# Patient Record
Sex: MALE | Race: WHITE | NOT HISPANIC OR LATINO | URBAN - METROPOLITAN AREA
[De-identification: names, ages, dates, MRNs, and addresses within clinical notes are randomized per-mention and may not be internally consistent; named-entity substitution may affect disease eponyms.]

---

## 2017-01-21 ENCOUNTER — AMBULATORY - HEALTHEAST (OUTPATIENT)
Dept: NEUROSURGERY | Facility: CLINIC | Age: 52
End: 2017-01-21

## 2017-01-25 ENCOUNTER — AMBULATORY - HEALTHEAST (OUTPATIENT)
Dept: NEUROSURGERY | Facility: CLINIC | Age: 52
End: 2017-01-25

## 2017-02-20 ENCOUNTER — COMMUNICATION - HEALTHEAST (OUTPATIENT)
Dept: NEUROSURGERY | Facility: CLINIC | Age: 52
End: 2017-02-20

## 2017-02-20 ENCOUNTER — AMBULATORY - HEALTHEAST (OUTPATIENT)
Dept: NEUROSURGERY | Facility: CLINIC | Age: 52
End: 2017-02-20

## 2017-02-20 DIAGNOSIS — M54.9 BACK PAIN: ICD-10-CM

## 2017-02-21 ENCOUNTER — AMBULATORY - HEALTHEAST (OUTPATIENT)
Dept: NEUROSURGERY | Facility: CLINIC | Age: 52
End: 2017-02-21

## 2017-02-23 ENCOUNTER — HOSPITAL ENCOUNTER (OUTPATIENT)
Dept: RADIOLOGY | Facility: CLINIC | Age: 52
Discharge: HOME | End: 2017-02-23
Attending: NEUROLOGICAL SURGERY

## 2017-02-23 ENCOUNTER — COMMUNICATION - HEALTHEAST (OUTPATIENT)
Dept: TELEHEALTH | Facility: CLINIC | Age: 52
End: 2017-02-23

## 2017-02-23 ENCOUNTER — COMMUNICATION - HEALTHEAST (OUTPATIENT)
Dept: NEUROSURGERY | Facility: CLINIC | Age: 52
End: 2017-02-23

## 2017-02-23 ENCOUNTER — OFFICE VISIT - HEALTHEAST (OUTPATIENT)
Dept: NEUROSURGERY | Facility: CLINIC | Age: 52
End: 2017-02-23

## 2017-02-23 DIAGNOSIS — M54.9 BACK PAIN: ICD-10-CM

## 2017-02-24 ENCOUNTER — AMBULATORY - HEALTHEAST (OUTPATIENT)
Dept: NEUROSURGERY | Facility: CLINIC | Age: 52
End: 2017-02-24

## 2017-03-03 ENCOUNTER — OFFICE VISIT - HEALTHEAST (OUTPATIENT)
Dept: PHYSICAL THERAPY | Facility: REHABILITATION | Age: 52
End: 2017-03-03

## 2017-03-03 ENCOUNTER — RECORDS - HEALTHEAST (OUTPATIENT)
Dept: BONE DENSITY | Facility: CLINIC | Age: 52
End: 2017-03-03

## 2017-03-03 ENCOUNTER — RECORDS - HEALTHEAST (OUTPATIENT)
Dept: GENERAL RADIOLOGY | Facility: CLINIC | Age: 52
End: 2017-03-03

## 2017-03-03 DIAGNOSIS — M54.50 CHRONIC MIDLINE LOW BACK PAIN WITHOUT SCIATICA: ICD-10-CM

## 2017-03-03 DIAGNOSIS — M54.9 DORSALGIA, UNSPECIFIED: ICD-10-CM

## 2017-03-03 DIAGNOSIS — M62.81 MUSCLE WEAKNESS (GENERALIZED): ICD-10-CM

## 2017-03-03 DIAGNOSIS — G89.29 CHRONIC MIDLINE LOW BACK PAIN WITHOUT SCIATICA: ICD-10-CM

## 2017-03-06 ENCOUNTER — AMBULATORY - HEALTHEAST (OUTPATIENT)
Dept: NEUROSURGERY | Facility: CLINIC | Age: 52
End: 2017-03-06

## 2017-03-06 DIAGNOSIS — M54.9 BACK PAIN: ICD-10-CM

## 2017-03-20 ENCOUNTER — HOSPITAL ENCOUNTER (OUTPATIENT)
Dept: RADIOLOGY | Facility: CLINIC | Age: 52
Discharge: HOME | End: 2017-03-20
Attending: NEUROLOGICAL SURGERY

## 2017-03-20 DIAGNOSIS — M54.9 BACK PAIN: ICD-10-CM

## 2017-03-22 ENCOUNTER — AMBULATORY - HEALTHEAST (OUTPATIENT)
Dept: NEUROSURGERY | Facility: CLINIC | Age: 52
End: 2017-03-22

## 2017-03-22 DIAGNOSIS — M54.9 BACK PAIN: ICD-10-CM

## 2017-03-27 ENCOUNTER — COMMUNICATION - HEALTHEAST (OUTPATIENT)
Dept: NEUROSURGERY | Facility: CLINIC | Age: 52
End: 2017-03-27

## 2017-04-18 ENCOUNTER — AMBULATORY - HEALTHEAST (OUTPATIENT)
Dept: NEUROSURGERY | Facility: CLINIC | Age: 52
End: 2017-04-18

## 2017-08-18 ENCOUNTER — COMMUNICATION - HEALTHEAST (OUTPATIENT)
Dept: NEUROSURGERY | Facility: CLINIC | Age: 52
End: 2017-08-18

## 2017-09-06 ENCOUNTER — OFFICE VISIT - HEALTHEAST (OUTPATIENT)
Dept: NEUROSURGERY | Facility: CLINIC | Age: 52
End: 2017-09-06

## 2017-09-06 DIAGNOSIS — M54.50 LOW BACK PAIN: ICD-10-CM

## 2017-09-06 DIAGNOSIS — M47.816 SPONDYLOSIS OF LUMBAR REGION WITHOUT MYELOPATHY OR RADICULOPATHY: ICD-10-CM

## 2017-09-06 DIAGNOSIS — M47.816 FACET ARTHROPATHY, LUMBAR: ICD-10-CM

## 2017-09-06 DIAGNOSIS — M51.369 DEGENERATIVE DISC DISEASE, LUMBAR: ICD-10-CM

## 2017-09-06 ASSESSMENT — MIFFLIN-ST. JEOR
SCORE: 1440.47
SCORE: 1440.47

## 2017-09-11 ENCOUNTER — AMBULATORY - HEALTHEAST (OUTPATIENT)
Dept: NEUROSURGERY | Facility: CLINIC | Age: 52
End: 2017-09-11

## 2017-09-11 ENCOUNTER — COMMUNICATION - HEALTHEAST (OUTPATIENT)
Dept: NEUROSURGERY | Facility: CLINIC | Age: 52
End: 2017-09-11

## 2017-09-11 DIAGNOSIS — M54.9 BACK PAIN: ICD-10-CM

## 2019-02-12 ENCOUNTER — COMMUNICATION - HEALTHEAST (OUTPATIENT)
Dept: NEUROSURGERY | Facility: CLINIC | Age: 54
End: 2019-02-12

## 2019-02-13 ENCOUNTER — AMBULATORY - HEALTHEAST (OUTPATIENT)
Dept: NEUROSURGERY | Facility: CLINIC | Age: 54
End: 2019-02-13

## 2019-02-13 DIAGNOSIS — G89.29 CHRONIC MIDLINE LOW BACK PAIN WITHOUT SCIATICA: ICD-10-CM

## 2019-02-13 DIAGNOSIS — M54.50 CHRONIC MIDLINE LOW BACK PAIN WITHOUT SCIATICA: ICD-10-CM

## 2019-02-19 ENCOUNTER — COMMUNICATION - HEALTHEAST (OUTPATIENT)
Dept: NEUROSURGERY | Facility: CLINIC | Age: 54
End: 2019-02-19

## 2019-02-25 ENCOUNTER — HOSPITAL ENCOUNTER (OUTPATIENT)
Dept: PHYSICAL MEDICINE AND REHAB | Facility: CLINIC | Age: 54
Discharge: HOME | End: 2019-02-25
Attending: NEUROLOGICAL SURGERY

## 2019-02-25 DIAGNOSIS — Z92.3 HISTORY OF RADIATION TO HEAD AND NECK REGION: ICD-10-CM

## 2019-02-25 DIAGNOSIS — Z87.898 HISTORY OF BRAIN TUMOR: ICD-10-CM

## 2019-02-25 DIAGNOSIS — Z92.21 HISTORY OF CHEMOTHERAPY: ICD-10-CM

## 2019-02-25 DIAGNOSIS — M47.816 SPONDYLOSIS OF LUMBAR REGION WITHOUT MYELOPATHY OR RADICULOPATHY: ICD-10-CM

## 2019-02-25 ASSESSMENT — MIFFLIN-ST. JEOR: SCORE: 1512.36

## 2019-02-27 ENCOUNTER — HOSPITAL ENCOUNTER (OUTPATIENT)
Dept: PHYSICAL MEDICINE AND REHAB | Facility: CLINIC | Age: 54
Discharge: HOME | End: 2019-02-27
Attending: PAIN MEDICINE

## 2019-02-27 DIAGNOSIS — M47.816 SPONDYLOSIS OF LUMBAR REGION WITHOUT MYELOPATHY OR RADICULOPATHY: ICD-10-CM

## 2020-09-18 ENCOUNTER — HOSPITAL ENCOUNTER (OUTPATIENT)
Dept: PHYSICAL MEDICINE AND REHAB | Facility: CLINIC | Age: 55
Discharge: HOME | End: 2020-09-18
Attending: PAIN MEDICINE

## 2020-09-18 DIAGNOSIS — Z92.21 HISTORY OF CHEMOTHERAPY: ICD-10-CM

## 2020-09-18 DIAGNOSIS — M47.816 SPONDYLOSIS OF LUMBAR REGION WITHOUT MYELOPATHY OR RADICULOPATHY: ICD-10-CM

## 2020-09-18 DIAGNOSIS — Z92.3 HISTORY OF RADIATION TO HEAD AND NECK REGION: ICD-10-CM

## 2020-09-18 DIAGNOSIS — Z87.898 HISTORY OF BRAIN TUMOR: ICD-10-CM

## 2020-09-30 ENCOUNTER — HOSPITAL ENCOUNTER (OUTPATIENT)
Dept: PHYSICAL MEDICINE AND REHAB | Facility: CLINIC | Age: 55
Discharge: HOME | End: 2020-09-30
Attending: PAIN MEDICINE

## 2020-09-30 DIAGNOSIS — M47.816 SPONDYLOSIS OF LUMBAR REGION WITHOUT MYELOPATHY OR RADICULOPATHY: ICD-10-CM

## 2020-09-30 DIAGNOSIS — Z87.898 HISTORY OF BRAIN TUMOR: ICD-10-CM

## 2020-09-30 DIAGNOSIS — M51.26 LUMBAR DISC HERNIATION: ICD-10-CM

## 2020-09-30 DIAGNOSIS — Z92.3 HISTORY OF RADIATION TO HEAD AND NECK REGION: ICD-10-CM

## 2020-09-30 DIAGNOSIS — Z92.21 HISTORY OF CHEMOTHERAPY: ICD-10-CM

## 2020-10-23 ENCOUNTER — HOSPITAL ENCOUNTER (OUTPATIENT)
Dept: PHYSICAL MEDICINE AND REHAB | Facility: CLINIC | Age: 55
Discharge: HOME | End: 2020-10-23
Attending: PAIN MEDICINE

## 2020-10-23 DIAGNOSIS — M47.816 SPONDYLOSIS OF LUMBAR REGION WITHOUT MYELOPATHY OR RADICULOPATHY: ICD-10-CM

## 2020-10-26 ENCOUNTER — AMBULATORY - HEALTHEAST (OUTPATIENT)
Dept: PHYSICAL MEDICINE AND REHAB | Facility: CLINIC | Age: 55
End: 2020-10-26

## 2020-10-26 ENCOUNTER — COMMUNICATION - HEALTHEAST (OUTPATIENT)
Dept: PHYSICAL MEDICINE AND REHAB | Facility: CLINIC | Age: 55
End: 2020-10-26

## 2020-10-26 DIAGNOSIS — M47.816 SPONDYLOSIS OF LUMBAR REGION WITHOUT MYELOPATHY OR RADICULOPATHY: ICD-10-CM

## 2020-10-30 ENCOUNTER — HOSPITAL ENCOUNTER (OUTPATIENT)
Dept: PHYSICAL MEDICINE AND REHAB | Facility: CLINIC | Age: 55
Discharge: HOME | End: 2020-10-30
Attending: PAIN MEDICINE

## 2020-10-30 DIAGNOSIS — M47.816 SPONDYLOSIS OF LUMBAR REGION WITHOUT MYELOPATHY OR RADICULOPATHY: ICD-10-CM

## 2020-11-02 ENCOUNTER — COMMUNICATION - HEALTHEAST (OUTPATIENT)
Dept: PHYSICAL MEDICINE AND REHAB | Facility: CLINIC | Age: 55
End: 2020-11-02

## 2020-11-02 DIAGNOSIS — M47.816 SPONDYLOSIS OF LUMBAR REGION WITHOUT MYELOPATHY OR RADICULOPATHY: ICD-10-CM

## 2020-11-09 ENCOUNTER — COMMUNICATION - HEALTHEAST (OUTPATIENT)
Dept: PHYSICAL MEDICINE AND REHAB | Facility: CLINIC | Age: 55
End: 2020-11-09

## 2020-11-09 DIAGNOSIS — M47.816 SPONDYLOSIS OF LUMBAR REGION WITHOUT MYELOPATHY OR RADICULOPATHY: ICD-10-CM

## 2021-01-08 ENCOUNTER — HOSPITAL ENCOUNTER (OUTPATIENT)
Dept: PHYSICAL MEDICINE AND REHAB | Facility: CLINIC | Age: 56
Discharge: HOME | End: 2021-01-08
Attending: PAIN MEDICINE

## 2021-01-08 DIAGNOSIS — M47.816 SPONDYLOSIS OF LUMBAR REGION WITHOUT MYELOPATHY OR RADICULOPATHY: ICD-10-CM

## 2021-02-23 ENCOUNTER — COMMUNICATION - HEALTHEAST (OUTPATIENT)
Dept: PHYSICAL MEDICINE AND REHAB | Facility: CLINIC | Age: 56
End: 2021-02-23

## 2021-05-31 VITALS — HEIGHT: 66 IN | BODY MASS INDEX: 23.3 KG/M2 | WEIGHT: 145 LBS

## 2021-05-31 VITALS — WEIGHT: 145 LBS | HEIGHT: 66 IN | BODY MASS INDEX: 23.3 KG/M2

## 2021-06-01 ENCOUNTER — RECORDS - HEALTHEAST (OUTPATIENT)
Dept: ADMINISTRATIVE | Facility: CLINIC | Age: 56
End: 2021-06-01

## 2021-06-02 VITALS — BODY MASS INDEX: 24.97 KG/M2 | HEIGHT: 67 IN | WEIGHT: 159.1 LBS

## 2021-06-02 VITALS — WEIGHT: 159.2 LBS | BODY MASS INDEX: 25.31 KG/M2

## 2021-06-05 VITALS — BODY MASS INDEX: 25.14 KG/M2 | WEIGHT: 158.1 LBS

## 2021-06-08 ENCOUNTER — APPOINTMENT (OUTPATIENT)
Dept: PHYSICAL THERAPY | Facility: CLINIC | Age: 56
End: 2021-06-08
Payer: COMMERCIAL

## 2021-06-08 ENCOUNTER — HOSPITAL ENCOUNTER (INPATIENT)
Facility: CLINIC | Age: 56
LOS: 10 days | Discharge: HOME | DRG: 884 | End: 2021-06-18
Attending: PHYSICAL MEDICINE & REHABILITATION | Admitting: PHYSICAL MEDICINE & REHABILITATION
Payer: COMMERCIAL

## 2021-06-08 DIAGNOSIS — M54.2 NECK PAIN: ICD-10-CM

## 2021-06-08 DIAGNOSIS — G93.40 ENCEPHALOPATHY: Primary | ICD-10-CM

## 2021-06-08 DIAGNOSIS — K21.00 GASTROESOPHAGEAL REFLUX DISEASE WITH ESOPHAGITIS WITHOUT HEMORRHAGE: ICD-10-CM

## 2021-06-08 DIAGNOSIS — K59.00 CONSTIPATION, UNSPECIFIED CONSTIPATION TYPE: ICD-10-CM

## 2021-06-08 DIAGNOSIS — Z00.00 PREVENTATIVE HEALTH CARE: ICD-10-CM

## 2021-06-08 DIAGNOSIS — E78.5 HYPERLIPIDEMIA LDL GOAL <70: ICD-10-CM

## 2021-06-08 DIAGNOSIS — G47.9 SLEEP DISTURBANCE: ICD-10-CM

## 2021-06-08 PROCEDURE — 99221 1ST HOSP IP/OBS SF/LOW 40: CPT | Performed by: PHYSICAL MEDICINE & REHABILITATION

## 2021-06-08 PROCEDURE — 97162 PT EVAL MOD COMPLEX 30 MIN: CPT | Mod: GP | Performed by: PHYSICAL THERAPIST

## 2021-06-08 PROCEDURE — 97530 THERAPEUTIC ACTIVITIES: CPT | Mod: GP | Performed by: PHYSICAL THERAPIST

## 2021-06-08 PROCEDURE — 250N000013 HC RX MED GY IP 250 OP 250 PS 637: Performed by: PHYSICIAN ASSISTANT

## 2021-06-08 PROCEDURE — 128N000003 HC R&B REHAB

## 2021-06-08 RX ORDER — ASPIRIN 81 MG/1
81 TABLET, CHEWABLE ORAL DAILY
Status: DISCONTINUED | OUTPATIENT
Start: 2021-06-09 | End: 2021-06-18 | Stop reason: HOSPADM

## 2021-06-08 RX ORDER — ACETAMINOPHEN 325 MG/1
650 TABLET ORAL EVERY 4 HOURS PRN
Status: ON HOLD | COMMUNITY
End: 2021-06-17

## 2021-06-08 RX ORDER — DIVALPROEX SODIUM 250 MG/1
250 TABLET, DELAYED RELEASE ORAL AT BEDTIME
Status: DISCONTINUED | OUTPATIENT
Start: 2021-06-08 | End: 2021-06-14

## 2021-06-08 RX ORDER — LIDOCAINE 4 G/G
1 PATCH TOPICAL EVERY 24 HOURS
Status: ON HOLD | COMMUNITY
End: 2021-06-17

## 2021-06-08 RX ORDER — FAMOTIDINE 10 MG
10 TABLET ORAL 2 TIMES DAILY
Status: DISCONTINUED | OUTPATIENT
Start: 2021-06-08 | End: 2021-06-18 | Stop reason: HOSPADM

## 2021-06-08 RX ORDER — FAMOTIDINE 20 MG/1
20 TABLET, FILM COATED ORAL 2 TIMES DAILY
Status: ON HOLD | COMMUNITY
End: 2021-06-17

## 2021-06-08 RX ORDER — THIAMINE HYDROCHLORIDE 100 MG/ML
100 INJECTION, SOLUTION INTRAMUSCULAR; INTRAVENOUS DAILY
Status: ON HOLD | COMMUNITY
End: 2021-06-17

## 2021-06-08 RX ORDER — SIMVASTATIN 20 MG
20 TABLET ORAL AT BEDTIME
Status: ON HOLD | COMMUNITY
End: 2021-06-17

## 2021-06-08 RX ORDER — HEPARIN SODIUM 5000 [USP'U]/.5ML
5000 INJECTION, SOLUTION INTRAVENOUS; SUBCUTANEOUS EVERY 12 HOURS
Status: DISCONTINUED | OUTPATIENT
Start: 2021-06-08 | End: 2021-06-08

## 2021-06-08 RX ORDER — POLYETHYLENE GLYCOL 3350 17 G/17G
17 POWDER, FOR SOLUTION ORAL DAILY PRN
Status: DISCONTINUED | OUTPATIENT
Start: 2021-06-08 | End: 2021-06-18 | Stop reason: HOSPADM

## 2021-06-08 RX ORDER — DIVALPROEX SODIUM 250 MG/1
250 TABLET, DELAYED RELEASE ORAL AT BEDTIME
Status: ON HOLD | COMMUNITY
End: 2021-06-17

## 2021-06-08 RX ORDER — HEPARIN SODIUM 5000 [USP'U]/.5ML
5000 INJECTION, SOLUTION INTRAVENOUS; SUBCUTANEOUS EVERY 8 HOURS
Status: ON HOLD | COMMUNITY
End: 2021-06-17

## 2021-06-08 RX ORDER — AMOXICILLIN 250 MG
1 CAPSULE ORAL 2 TIMES DAILY PRN
Status: DISCONTINUED | OUTPATIENT
Start: 2021-06-08 | End: 2021-06-18 | Stop reason: HOSPADM

## 2021-06-08 RX ORDER — LIDOCAINE 4 G/G
1 PATCH TOPICAL
Status: DISCONTINUED | OUTPATIENT
Start: 2021-06-08 | End: 2021-06-18 | Stop reason: HOSPADM

## 2021-06-08 RX ORDER — ACETAMINOPHEN 325 MG/1
325-650 TABLET ORAL EVERY 4 HOURS PRN
Status: DISCONTINUED | OUTPATIENT
Start: 2021-06-08 | End: 2021-06-18 | Stop reason: HOSPADM

## 2021-06-08 RX ORDER — SIMVASTATIN 20 MG
20 TABLET ORAL EVERY EVENING
Status: CANCELLED | OUTPATIENT
Start: 2021-06-08

## 2021-06-08 RX ADMIN — Medication 5 MG: at 21:06

## 2021-06-08 RX ADMIN — DIVALPROEX SODIUM 250 MG: 250 TABLET, DELAYED RELEASE ORAL at 21:06

## 2021-06-08 RX ADMIN — LIDOCAINE 1 PATCH: 560 PATCH PERCUTANEOUS; TOPICAL; TRANSDERMAL at 21:06

## 2021-06-08 RX ADMIN — FAMOTIDINE 10 MG: 10 TABLET ORAL at 21:06

## 2021-06-08 RX ADMIN — ACETAMINOPHEN 325 MG: 325 TABLET, FILM COATED ORAL at 19:06

## 2021-06-08 ASSESSMENT — MIFFLIN-ST. JEOR: SCORE: 1484.01

## 2021-06-08 NOTE — H&P
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  On evening of admission, 5/21/21, wife found patient unresponsive with seizure activity.  EMS found patient with ongoing seizure activity, valium administered without change.  Patient was intubated for airway protection and transported to [facility] for ongoing evaluation.  In ED, patient no longer seizing, loaded with Keppra 1000 mg IV x1, but found to have severe hyponatremia at 109, and suspected seizures related to hyponatremia, so no ongoing anti-epileptics administered.  CT head with no acute intracranial abnormality. For hyponatremia, patient received normal saline bolus and then started on 3% saline.  Upon recheck after 4 hours, sodium increased to 123.  Given concern for too rapid correction, D5W and 1 dose of DDAVP administered with reduction to 120.  Nephrology was consulted for ongoing management.  Serum and urine osmolality suggestive of polydipsia, with possible contributions of ADH physiology in setting of sertraline and vomiting.  Sodium was gradually corrected and stable at discharge.    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  Imaging revealed two small right temporal infarcts.  No recurrence of brain mass or signs of demyelination.  Neurology did not feel the infarcts were related to patient's clinical presentation, but recommended repeat imaging in  several days if no significant improvement.  Neurology did note that patient would likely have prolonged recovery time from seizures given history of brain tumor and numerous chronic microhemorrhages, as well as large amount of sedation at initial presentation, and recovering hyponatremia. Patient was extubated on 5/23, but noted to have increasing agitation.  Repeat MRI brain on 5/26 demonstrated evolution of incidental strokes, no new acute changes, no evidence of pontine demyelination.    On 5/22-5/23 patient noted to have fevers and new sputum production, with concern for possible aspiration pneumonia.  CXR negative for acute infiltrates.  Sputum culture with MRSA.  He completed a course of IV vancomycin through 5/31.    On 5/29, patient became significantly more agitated and delirious, with associated hallucinations and attempts to hit staff.  Code green was called. IM Zyprexa ineffective and worsened during 5/30 AM, requiring transfer to ICU, started on Precedex drip.  Psychiatry consulted and he was started on Seroquel.  Repeat brain MRI considered, but deferred due to JACK and concern for additional IV contrast administration.  With ongoing agitation despite PRN Haldol and IV Zyprexa use, valproate was trialed, and noted improvement with no significant agitation persisting after 6/2.  Seroquel was weaned to PRN and transitioned to delayed release divalproex.      Hospital course was also complicated by hypokalemia (likley secondary to vomiting, since resolved), hypomagnesemia (replaced, resolved), respiratory alkalosis (corrected on ventilator), dysphagia (felt to be correlated with encephalopathy, now tolerating regular diet, thin liquids), JACK (possible multifactorial due to IV contrast, volume depletion in setting of NPO d/t dysphagia), elevated CK (likely secondary to seizures), anemia, GERD.    During acute hospitalization, patient was seen and evaluated by PT and OT, who collectively recommended that  patient would benefit from ongoing therapies in the acute inpatient rehabilitation setting.      In review of the therapy notes, patient requires standby assist for bed mobility and contact guard assist for sit to stand transfers with FWW.  He ambulates 350' with FWW and contact guard assist with multiple rest breaks.  He is noted to have small shuffled steps, min postural sway, and need verbal cues for bigger steps and foot clearance.  He needs supervision and set-up for eating, standby to contact guard assist for grooming, min assist for upper and lower body dressing.  He requires verbal cues for carryover of therapy techniques, min assist for sequencing and two-step commands when implementing newly learned therapy techniques.  His dysphagia has resolved and currently tolerating regular diet with thin liquids.  He requires full cognitive-linguistic evaluation and treatment due to noted cognitive deficits in short-term memory and sequencing of steps during therapy sessions.    Upon arrival to the rehab unit, patient is resting.  Family at bedside.  No distress.  Hopes to get stronger.  Right side slightly weaker, but this has been the case since brain tumor removal.  Hopes to advance off of walker.    PAST MEDICAL HISTORY   Reviewed and updated in Epic.  No past medical history on file.    SURGICAL HISTORY  Reviewed and updated in Epic.  No past surgical history on file.    SOCIAL HISTORY  Reviewed and updated in Epic.  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  Tobacco use: denies  Alcohol use: current   Illicit drug use: denies  Social History     Socioeconomic History     Marital status:      Spouse name: Not on file     Number of children: Not on file     Years of education: Not on file     Highest education level: Not  on file   Occupational History     Not on file   Social Needs     Financial resource strain: Not on file     Food insecurity     Worry: Not on file     Inability: Not on file     Transportation needs     Medical: Not on file     Non-medical: Not on file   Tobacco Use     Smoking status: Not on file   Substance and Sexual Activity     Alcohol use: Not on file     Drug use: Not on file     Sexual activity: Not on file   Lifestyle     Physical activity     Days per week: Not on file     Minutes per session: Not on file     Stress: Not on file   Relationships     Social connections     Talks on phone: Not on file     Gets together: Not on file     Attends Gnosticist service: Not on file     Active member of club or organization: Not on file     Attends meetings of clubs or organizations: Not on file     Relationship status: Not on file     Intimate partner violence     Fear of current or ex partner: Not on file     Emotionally abused: Not on file     Physically abused: Not on file     Forced sexual activity: Not on file   Other Topics Concern     Not on file   Social History Narrative     Not on file       FAMILY HISTORY  Reviewed and updated in Epic.  No family history on file.      PRIOR FUNCTIONAL HISTORY   Pt was independent with all ADLs/IADLs, transfers, mobility and gait.      MEDICATIONS  Scheduled meds  Medications Prior to Admission   Medication Sig Dispense Refill Last Dose     acetaminophen (TYLENOL) 325 MG tablet Take 650 mg by mouth every 4 hours as needed for mild pain   6/8/2021 at 0849     divalproex sodium delayed-release (DEPAKOTE) 250 MG DR tablet Take 250 mg by mouth At Bedtime   6/7/2021 at Unknown time     famotidine (PEPCID) 20 MG tablet Take 20 mg by mouth 2 times daily   6/8/2021 at Unknown time     Heparin Sodium 5000 UNIT/0.5ML injection Inject 5,000 Units Subcutaneous every 8 hours   6/8/2021 at 0849     Lidocaine (LIDOCARE) 4 % Patch Place 1 patch onto the skin every 24 hours To prevent  "lidocaine toxicity, patient should be patch free for 12 hrs daily.   6/8/2021 at 0003     melatonin 5 MG CAPS Take 5 mg by mouth At Bedtime May repeat x1   6/7/2021 at Unknown time     simvastatin (ZOCOR) 20 MG tablet Take 20 mg by mouth At Bedtime   6/7/2021 at Unknown time     thiamine (B-1) 100 MG/ML injection 100 mg daily   6/8/2021 at 0850       ALLERGIES     Allergies   Allergen Reactions     Fentanyl Other (See Comments)     Amoxicillin GI Disturbance     Gramineae Pollens      Other Environmental Allergy          REVIEW OF SYSTEMS     ROS: 10 point ROS neg other than the symptoms noted above in the HPI.      PHYSICAL EXAM  VITAL SIGNS:  /72 (BP Location: Left arm)   Pulse 92   Temp 96.2  F (35.7  C) (Oral)   Resp 18   Ht 1.676 m (5' 6\")   Wt 70.1 kg (154 lb 9.6 oz)   SpO2 97%   BMI 24.95 kg/m    BMI:  Estimated body mass index is 24.95 kg/m  as calculated from the following:    Height as of this encounter: 1.676 m (5' 6\").    Weight as of this encounter: 70.1 kg (154 lb 9.6 oz).     General: NAD, resting in bed  HEENT: NCAT, EOMI, slight R sided facial weakness, slightly masked like  Pulmonary: no distress, on RA, symmetrical chest rise  Cardiovascular: 2+ radial pulse, well perfused  Abdominal: soft, nontender  Extremities: warm, well perfused, no edema in bilateral lower extremities, no tenderness in calves   MSK/neuro:   Mental Status:  alert and oriented x3    Cranial Nerves: grossly normal   1. 2nd CN: Pupils equal, round, reactive to light and accomodation. and visual fields intact to confrontation.   2. 3rd,4th,6th CN:  EOMI, appropriate pupillary responses  3. 5th CN: facial sensation intact   4. 7th CN: face symmetrical   5. 8th CN: functional hearing bilaterally  6. 9th, 10th CN: palate elevates symmetrically   7. 11th CN: sternocleidomastoids and trapezii strong   8. 12th CN: tongue midline and without fasciculations     Sensory: Normal to light touch in bilateral upper and lower " extremities    Strength: 4+/5 in all muscle groups of the upper and lower extremities on L and 4/5 on R.   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  He is admitted to ARU on 6/8/21 for rehabilitation and ongoing medical management.    Admission to acute inpatient rehab 06/08/21.    Impairment group code: 01.4 Stroke, no paresis.  Right temporal lobe infarcts.      1. PT, OT and SLP 60 minutes of each on a daily basis, in addition to rehab nursing and close management of physiatrist.      2. Impairment of ADL's: Noted to have impaired mobility, impaired balance, impaired strength, impaired activity tolerance, and impaired cognition, all affecting his ability to safely and independently perform basic ADLs.  Goal for mod I with basic ADLs.    3. Impairment of mobility:  Noted to have impaired mobility, impaired balance, impaired strength, impaired activity tolerance, and impaired cognition, all affecting his ability to safely and independently perform basic mobility.  Goal for mod I with basic mobility.    4. Impairment of cognition/language/swallow:  Noted to have impaired mobility, especially in short-term memory and sequencing and would benefit from full cognitive-linguistic evaluation and treatment with goal of  improved cognitive/linguistic skills to allow for completion of ADLs and interactions.    5. Medical Conditions    Seizures  Received valium per EMS, ongoing seizures.  Loaded with Keppra, benzodiazepine in ED.  Neurology consulted.  Likely precipitated by severe hyponatremia.  Required intubation for airway protection.  EEG consistent with severe encephalopathy consistent with postictal state, sedation, hyponatremia, but no seizure activity observed.  Per neuro, likely will have prolonged recovery time given history of brain tumor and numerous chronic microhemorrhages, large amount of sedation that was initially used, and recovering hyponatremia.  Seizures resolved with stabilization of sodium levels.  Neurology did not feel AEDs indicated given underlying cause (hyponatremia) treated.  - Seizure precautions    Incidental CVA  Noted on brain MRI 5/22 with 2 small infarcts in R temporal lobe.  Neurology following patient and felt these areas were incidental findings, unrelated to his current presentation.  Repeat brain MRI 5/27 with interval increase in size of 2 areas of acute infarct in R temporal lobe.  - Continue PTA ASA 81 mg    Acute delirium/encephalopathy   Noted beginning evening of 5/29.  Associated with confusion, agitation, visual hallucinations, combativeness, impulsive, pulling at lines, devices and threatening to hit at staff.  Felt to be likely in the setting of underlying brain pathology from medulloblastoma, significant hyponatremia early hospital course, coexisting temporal stroke.  Transferred to ICU and received Precedex infusion.  Psychiatry consulted, started on scheduled Seroquel and IV valproic acid.  Seroquel has since been weaned down to as needed and delayed release oral Depakote started at bedtime, currently at 250 mg.  No ongoing significant agitation since 6/2.  Elected to defer repeat brain MRI due to contrast (JACK) and mental status now returned to baseline.  - Monitor for  recurrence  - Continue Depakote 250 mg delayed-release tablet at bedtime  - Continue Seroquel 6.25 mg BID PRN for agitation  - Continue melatonin 5 mg at bedtime  - Continue PT/OT/SLP    Hx Cerebellar Medulloblastoma s/p surgical resection and chemotherapy 2007  - Brain imaging with post-op changes, no evidence of recurrent mass    Acute kidney injury  Developed JACK, felt likely contrast nephropathy given timing ~3 days after MRI brain with contrast.  Cr jumped from 0.88 to peak of 1.55, down-trending since.  Also suspect possible contribution of reduced oral fluid intake, NPO due to dysphagia on 5/31.  Kidney function improved with IV hydration, though Cr still remains elevated at 1.40 on 6/7.  - Trend BMP  - Avoid further IV dye for now    Hyponatremia   Severe. Initial sodium 109 with unintentional rapid correction to 123, D5W given with reduction to 120.  Managed by nephrology.  [place] likely related to polydipsia since urine osmolality was not elevated and ADH physiology.  Patient was also on sertraline as outpatient, which was discontinued due to possible contribution.  Sodium with ongoing improvement, stable at 138.  - Trend BMP  - Continue fluid restriction of 1.8 L  - Per nephrology, avoid proton pump inhibitors, ACE inhibitors, thiazide diuretics, and SSRI medications for now.    Possible aspiration pneumonia  Acute respiratory failure with hypoxia, resolved  Required intubation for airway protection.  Extubated on 5/23 with no further airway concerns or hypoxia.  Productive cough initially.  Mild interstitial prominence of right lung base on x-ray.  Sputum culture with MRSA. Sensitivity noted.  Patient treated with IV vancomycin, with stop date of 5/31.  - Monitor respiratory status  - Encourage IS  - Supplemental oxygen PRN    Gastroesophageal reflux  1 episode of emesis night of 6/4 but seemed related to specific food.  Elected to defer GI consult with no clear indication for EGD.  Started on famotidine  rather than PPI due to recent significant hyponatremia.  - Continue famotidine 20 mg daily.    Elevated BP  Patient normotensive today.   - Monitor BP    Nutrition  Dysphagia, resolved  Refused nasogastric tube 5/28.  SLP assessed 5/29 and recommended soft diet.  Discussed with speech therapy 6/2. Dysphagia correlates with declining mental status from encephalopathy.  Speech therapy recommended video swallow for 6/3 with speech to rule out silent aspiration. Results shows multiple episodes of penetration with thin liquid barium. No evidence of aspiration.  Diet upgraded to regular diet, thin liquids by speech therapy on 6/4.  - Continue SLP  - Continue regular diet with thin liquids    Hyperlipidemia  PTA simvastatin held in setting of elevated CK (4034 at admission, felt like secondary to seizures).  - Per discharge summary, check CK in 2 days (6/10), resume simvastatin if back to normal.    Hx PTSD  [facility] psychology[facility]  Started on sertraline 25 mg daily on 4/9/21.  This admission, PTA sertraline discontinued in setting of severe hyponatremia.  - Continue to hold sertraline  - Follow up with outpatient psychiatry to consider alternative to sertraline for PTSD    6. Adjustment to disability:  Clinical psychology to eval and treat as indicated  7. FEN: regular diet, thin liquids  8. Bowel: continent, monitor, PRN bowel meds available  9. Bladder: continent, monitor PVRs at admission, ISC as indicated  10. DVT Prophylaxis: mechanical, ambulation  11. GI Prophylaxis: famotidine  12. Code: full  13. Disposition: home  14. ELOS:  12 days  15. Rehab prognosis:  good  16. Follow up Appointments on Discharge: PCP, psychiatry, neurology?, PM&R        Patient discussed with Dr. David Granados, PM&R staff physician     Lorenza Draper PA-C  Physical Medicine & Rehabilitation      Physician Attestation   I, David Granados, was present with the GOVIND who participated in  the service and in the documentation of the note.  I have verified the history and personally performed the physical exam and medical decision making.  I agree with the assessment and plan of care as documented in the note.      I personally reviewed vital signs, medications and labs.        David Granados,   Date of Service (when I saw the patient): 06/08/21      I spent a total of 35 minutes face to face and coordinating care of Otoniel PAUL Patricia. Over 50% of my time on the unit was spent counseling the patient and /or coordinating care regarding recent seizure as well as hyponatremia with CVA and encephalopathy .

## 2021-06-08 NOTE — PLAN OF CARE
FOCUS/GOAL  Mobility and Safety management    ASSESSMENT, INTERVENTIONS AND CONTINUING PLAN FOR GOAL:  Pt is alert and oriented x4. Arrived to unit at approx 1430 with family transport. Transfers with assist of 1 and gait belt for short distances, with walker for longer. Given instructions for call light usage and unit routine. Will cont with plan of care.

## 2021-06-08 NOTE — PHARMACY-ADMISSION MEDICATION HISTORY
Admission Medication History Completed by Pharmacy    See [facility] Admission Navigator for allergy information, preferred outpatient pharmacy, prior to admission medications and immunization status.     Medication history sources:  previous institution's MAR    Changes made to PTA medication list (reason)  Added: APAP, Depakote, famotidine, heparin, Lido patch, melatonin, simvastatin, thiamine  Deleted: none  Changed: none    Additional medication history information:   n/a  - Patient denies taking any additional Rx/OTC medications other than the ones listed below.    Prior to Admission medications    Medication Sig Last Dose Taking? Auth Provider   acetaminophen (TYLENOL) 325 MG tablet Take 650 mg by mouth every 4 hours as needed for mild pain 6/8/2021 at 0849 Yes Unknown, Entered By History   divalproex sodium delayed-release (DEPAKOTE) 250 MG DR tablet Take 250 mg by mouth At Bedtime 6/7/2021 at Unknown time Yes Unknown, Entered By History   famotidine (PEPCID) 20 MG tablet Take 20 mg by mouth 2 times daily 6/8/2021 at Unknown time Yes Unknown, Entered By History   Heparin Sodium 5000 UNIT/0.5ML injection Inject 5,000 Units Subcutaneous every 8 hours 6/8/2021 at 0849 Yes Unknown, Entered By History   Lidocaine (LIDOCARE) 4 % Patch Place 1 patch onto the skin every 24 hours To prevent lidocaine toxicity, patient should be patch free for 12 hrs daily. 6/8/2021 at 0003 Yes Unknown, Entered By History   melatonin 5 MG CAPS Take 5 mg by mouth At Bedtime May repeat x1 6/7/2021 at Unknown time Yes Unknown, Entered By History   simvastatin (ZOCOR) 20 MG tablet Take 20 mg by mouth At Bedtime 6/7/2021 at Unknown time Yes Unknown, Entered By History   thiamine (B-1) 100 MG/ML injection 100 mg daily 6/8/2021 at 0850 Yes Unknown, Entered By History          Date completed: 06/08/21    Arlene Jones [facility]

## 2021-06-08 NOTE — PLAN OF CARE
"Discharge Planner Post-Acute Rehab PT:     Discharge Plan: Home with spouse, 5 COMPA with R rail. OP PT.    Precautions: Falls    Current Status:  Bed Mobility: IND  Transfer: CGA without device  Gait: up to 300 ft CGA. Recommend training without device in therapy, use of FWW with nursing for now.  Stairs: 12x6\" steps with R rail, reciprocal, CGA  Balance: MURRAY on 6/8: 38/56    Assessment:  Pt presents to PT with impaired balance, LE coordination s/p hospitalization for severe hyponatremia with seizures, with PMH significant for cerebellar medulloblastoma resection 12 years prior. Pt should benefit well from intensive skilled PT during ARU stay to discharge home with spouse, IND.    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

## 2021-06-08 NOTE — PROGRESS NOTES
"   06/08/21 1601   Quick Adds   Type of Visit Initial PT Evaluation       Present no   Language English   Living Environment   People in home spouse   Current Living Arrangements house   Home Accessibility stairs to enter home;stairs within home   Transportation Anticipated family or friend will provide   Living Environment Comments Pt lives with spouse in house. 5 COMPA with R rail. Stairs to the basement, but does not need to access: bed/bath/kitchen/living room all on the first floor.   Self-Care   Usual Activity Tolerance good   Current Activity Tolerance moderate   Equipment Currently Used at Home none   Disability/Function   Hearing Difficulty or Deaf yes   Patient's preferred means of communication verbal   Describe hearing loss bilateral hearing loss   Use of hearing assistive devices bilateral hearing aids   Wear Glasses or Blind yes   Vision Management glasses   Concentrating, Remembering or Making Decisions Difficulty yes   Difficulty Communicating no   Difficulty Eating/Swallowing no   Walking or Climbing Stairs Difficulty no   Dressing/Bathing Difficulty no   Toileting issues no   Doing Errands Independently Difficulty (such as shopping) no   Fall history within last six months no   Change in Functional Status Since Onset of Current Illness/Injury yes   General Information   Onset of Illness/Injury or Date of Surgery 05/21/21   Referring Physician David Granados, DO   Patient/Family Therapy Goals Statement (PT) \"Better balance to be able to walk without a walker\"   Pertinent History of Current Problem (include personal factors and/or comorbidities that impact the POC) \"Otoniel Gomez is a 55 year old male with past medical history of 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, chemotherapy in 2007, asthma, hyperlipidemia who presented on 5/21 with seizures and severe hyponatremia requiring intubation for airway protection 5/21-5/23, " "with hospital course complicated by R temporal lobe infarcts on imaging, JACK, significant encephalopathy and agitation, possible aspiration pneumonia, GERD, and dysphagia.\"   Existing Precautions/Restrictions fall   Weight-Bearing Status - LUE full weight-bearing   Weight-Bearing Status - RUE full weight-bearing   Weight-Bearing Status - LLE full weight-bearing   Weight-Bearing Status - RLE full weight-bearing   General Observations Pleasant and cooperative. Requires increased time for processing and intermittent difficulty with memory, word retrieval.   Cognition   Orientation Status (Cognition) oriented x 4   Affect/Mental Status (Cognition) flat/blunted affect   Follows Commands (Cognition) increased processing time needed;initiation impaired;repetition of directions required;follows multi-step commands;75-90% accuracy   Behavioral Issues   (None noted)   Safety Deficit (Cognition) insight into deficits/self-awareness;awareness of need for assistance   Memory Deficit (Cognition)   (Appears at least mildly impaired)   Pain Assessment   Patient Currently in Pain No   Integumentary/Edema   Integumentary/Edema no deficits were identifed   Posture    Posture Comments Slight L lean in standing, with ambulation   Range of Motion (ROM)   ROM Comment Grossly WNL informally assessed through functional observation   MMT: Hip, Rehab Eval   Hip Flexion - Left Side (5/5) normal,left   Hip Flexion - Right Side (5/5) normal,right   MMT: Knee, Rehab Eval   Knee Flexion - Left Side (5/5) normal,left   Knee Extension - Left Side (5/5) normal,left   Knee Flexion - Right Side (5/5) normal,right   Knee Extension - Right Side (5/5) normal,right   MMT: Ankle, Rehab Eval   Ankle Dorsiflexion - Left Side 5/5) normal,left   Ankle Plantarflexion - Left Side 5/5) normal,left   Ankle Dorsiflexion - Right Side 5/5) normal,right   Ankle Plantarflexion - Right Side 5/5) normal, right   ARC Assessment Only   Acute Rehab Functional Assessment See " IP Rehab Daily Documentation Flowsheet for Functional Mobility/ADL Assessment   Balance   Balance Comments MURRAY/56   Sensory Examination   Sensory Perception Comments Intact to light touch t/o bilat LE   Coordination   Coordination Comments Mildly impaired bilat LE as assessed through alternate toe tapping, heel-shin slides, and observation of gait   Oculomotor Exam   Smooth Pursuit Comment Corrective saccades noted in all directions   Saccades Comments Dysmetria with avg of 2-3 various under- and overshoots in all directions   Infrared Goggle Exam or Frenzel Lense Exam   Exam completed with Room Light   Spontaneous Nystagmus Horizontal R   Spontaneous Nystagmus Comments improved with fixation   Gaze Evoked Nystagmus Horizontal R   Gaze Evoked Nystagmus Comments in all planes except with end-gaze to the L: horizontal L   Clinical Impression   Criteria for Skilled Therapeutic Intervention yes, treatment indicated   PT Diagnosis (PT) impaired balance, coordination   Influenced by the following impairments impaired balance, coordination, previous cerebellar medulloblastoma resection. Impaired occulomotor control.   Functional limitations due to impairments transfers, gait, stairs   Clinical Presentation Evolving/Changing   Clinical Presentation Rationale hospital course complicated by R temporal lobe infarcts on imaging, JACK, significant encephalopathy and agitation, possible aspiration pneumonia, GERD, and dysphagia. Previous cerebellar medulloblastoma 12 years prior with previously impaired balance and likely OCM control   Clinical Decision Making (Complexity) moderate complexity   Therapy Frequency (PT) Daily   Predicted Duration of Therapy Intervention (days/wks) 7 days   Planned Therapy Interventions (PT) balance training;gait training;neuromuscular re-education;patient/family education;stair training;strengthening;stretching;transfer training   Anticipated Equipment Needs at Discharge (PT)   (Possible walker)    Risk & Benefits of therapy have been explained evaluation/treatment results reviewed;care plan/treatment goals reviewed;risks/benefits reviewed;current/potential barriers reviewed;participants voiced agreement with care plan;participants included;patient;spouse/significant other;son   Clinical Impression Comments Pt presents to PT with impaired balance, LE coordination s/p hospitalization for severe hyponatremia with seizures, with PMH significant for cerebellar medulloblastoma resection 12 years prior. Pt should benefit well from intensive skilled PT during ARU stay to discharge home with spouse, IND.   Total Evaluation Time   Total Evaluation Time (Minutes) 40

## 2021-06-09 ENCOUNTER — APPOINTMENT (OUTPATIENT)
Dept: SPEECH THERAPY | Facility: CLINIC | Age: 56
End: 2021-06-09
Payer: COMMERCIAL

## 2021-06-09 ENCOUNTER — APPOINTMENT (OUTPATIENT)
Dept: PHYSICAL THERAPY | Facility: CLINIC | Age: 56
End: 2021-06-09
Payer: COMMERCIAL

## 2021-06-09 ENCOUNTER — APPOINTMENT (OUTPATIENT)
Dept: OCCUPATIONAL THERAPY | Facility: CLINIC | Age: 56
End: 2021-06-09
Payer: COMMERCIAL

## 2021-06-09 LAB
ANION GAP SERPL CALCULATED.3IONS-SCNC: 8 MMOL/L (ref 3–14)
BUN SERPL-MCNC: 26 MG/DL (ref 7–30)
CALCIUM SERPL-MCNC: 9.1 MG/DL (ref 8.5–10.1)
CHLORIDE SERPL-SCNC: 95 MMOL/L (ref 94–109)
CO2 SERPL-SCNC: 29 MMOL/L (ref 20–32)
CREAT SERPL-MCNC: 1.39 MG/DL (ref 0.66–1.25)
ERYTHROCYTE [DISTWIDTH] IN BLOOD BY AUTOMATED COUNT: 14.4 % (ref 10–15)
GFR SERPL CREATININE-BSD FRML MDRD: 57 ML/MIN/{1.73_M2}
GLUCOSE SERPL-MCNC: 81 MG/DL (ref 70–99)
HCT VFR BLD AUTO: 32 % (ref 40–53)
HGB BLD-MCNC: 10.1 G/DL (ref 13.3–17.7)
MCH RBC QN AUTO: 27.7 PG (ref 26.5–33)
MCHC RBC AUTO-ENTMCNC: 31.6 G/DL (ref 31.5–36.5)
MCV RBC AUTO: 88 FL (ref 78–100)
PLATELET # BLD AUTO: 253 10E9/L (ref 150–450)
POTASSIUM SERPL-SCNC: 5 MMOL/L (ref 3.4–5.3)
RBC # BLD AUTO: 3.65 10E12/L (ref 4.4–5.9)
SODIUM SERPL-SCNC: 132 MMOL/L (ref 133–144)
WBC # BLD AUTO: 6.2 10E9/L (ref 4–11)

## 2021-06-09 PROCEDURE — 92523 SPEECH SOUND LANG COMPREHEN: CPT | Mod: GN | Performed by: SPEECH-LANGUAGE PATHOLOGIST

## 2021-06-09 PROCEDURE — 250N000013 HC RX MED GY IP 250 OP 250 PS 637: Performed by: PHYSICIAN ASSISTANT

## 2021-06-09 PROCEDURE — 80048 BASIC METABOLIC PNL TOTAL CA: CPT | Performed by: PHYSICIAN ASSISTANT

## 2021-06-09 PROCEDURE — 97112 NEUROMUSCULAR REEDUCATION: CPT | Mod: GP

## 2021-06-09 PROCEDURE — 97116 GAIT TRAINING THERAPY: CPT | Mod: GP

## 2021-06-09 PROCEDURE — 99232 SBSQ HOSP IP/OBS MODERATE 35: CPT | Performed by: PHYSICAL MEDICINE & REHABILITATION

## 2021-06-09 PROCEDURE — 85027 COMPLETE CBC AUTOMATED: CPT | Performed by: PHYSICIAN ASSISTANT

## 2021-06-09 PROCEDURE — 97166 OT EVAL MOD COMPLEX 45 MIN: CPT | Mod: GO | Performed by: OCCUPATIONAL THERAPIST

## 2021-06-09 PROCEDURE — 36415 COLL VENOUS BLD VENIPUNCTURE: CPT | Performed by: PHYSICIAN ASSISTANT

## 2021-06-09 PROCEDURE — 128N000003 HC R&B REHAB

## 2021-06-09 PROCEDURE — 97530 THERAPEUTIC ACTIVITIES: CPT | Mod: GO | Performed by: OCCUPATIONAL THERAPIST

## 2021-06-09 RX ADMIN — ASPIRIN 81 MG CHEWABLE TABLET 81 MG: 81 TABLET CHEWABLE at 08:00

## 2021-06-09 RX ADMIN — DIVALPROEX SODIUM 250 MG: 250 TABLET, DELAYED RELEASE ORAL at 21:01

## 2021-06-09 RX ADMIN — LIDOCAINE 1 PATCH: 560 PATCH PERCUTANEOUS; TOPICAL; TRANSDERMAL at 21:01

## 2021-06-09 RX ADMIN — FAMOTIDINE 10 MG: 10 TABLET ORAL at 20:58

## 2021-06-09 RX ADMIN — Medication 5 MG: at 21:01

## 2021-06-09 RX ADMIN — ACETAMINOPHEN 650 MG: 325 TABLET, FILM COATED ORAL at 16:11

## 2021-06-09 RX ADMIN — FAMOTIDINE 10 MG: 10 TABLET ORAL at 08:00

## 2021-06-09 NOTE — PROGRESS NOTES
"  [facility]   Acute Rehabilitation Unit  Daily progress note    INTERVAL HISTORY  Otoniel Gomez was seen and examined at bedside.  No acute events reported overnight.  He states that sleep was \"ok\" but with several interruptions (nursing, lab, etc.).  He is noting new left calf pain this morning, which is aggravated by walking.  He denies any injury of history of prior pain in this area.  Pain is intermittent, not aggravated by dorsi/plantarflexion.  He has not tried any interventions to alleviate.  He also notes bilateral neck pain at the base of his neck, which has been present throughout his hospitalization.  He attributes to prior injury while tubing.  Alleviated by lidocaine patch.  He notes intermittent cough, better today than last night.  Some shortness of breath during coughing episodes.  He denies dizziness, nausea, bowel or bladder concerns.  Did self-transfer several times yesterday without calling for assistance.  Re-educated regarding safety and need to await assistance.  AM labs reviewed, noted improved anemia, stable Cr, recurrent mild hyponatremia (138->132).      Functionally, therapy evals underway today.    MEDICATIONS    aspirin  81 mg Oral Daily     divalproex sodium delayed-release  250 mg Oral At Bedtime     famotidine  10 mg Oral BID     lidocaine  1 patch Transdermal Q24h    And     lidocaine   Transdermal Q8H     melatonin  5 mg Oral At Bedtime        acetaminophen, polyethylene glycol, senna-docusate     PHYSICAL EXAM  /81 (BP Location: Left arm)   Pulse 83   Temp 96.3  F (35.7  C) (Oral)   Resp 16   Ht 1.676 m (5' 6\")   Wt 70.1 kg (154 lb 9.6 oz)   SpO2 100%   BMI 24.95 kg/m    Gen: NAD, lying in bed  HEENT: well-healed scar overlying right parietal region, MMM, wearing hearing aids  Cardio: RRR, no murmurs  Pulm: non-labored on room air.  Mild, intermittent, dry cough.  Lungs CTA bilaterally.  Abd: soft, non-tender, non-distended, " bowel sounds present  Ext: no edema, warmth, redness in bilateral lower extremities.  Tenderness to palpation in left posterior calf, denies aggravation with active ROM, but increased with passive DF.  Neuro/MSK: AAOx2-3, slightly slowed but appropriate responses, follows commands, tightness in right trap/levator     LABS  Lab Results   Component Value Date    WBC 6.2 06/09/2021     Lab Results   Component Value Date    RBC 3.65 06/09/2021     Lab Results   Component Value Date    HGB 10.1 06/09/2021     Lab Results   Component Value Date    HCT 32.0 06/09/2021     Lab Results   Component Value Date    MCV 88 06/09/2021     Lab Results   Component Value Date    MCH 27.7 06/09/2021     Lab Results   Component Value Date    MCHC 31.6 06/09/2021     Lab Results   Component Value Date    RDW 14.4 06/09/2021     Lab Results   Component Value Date     06/09/2021     Last Comprehensive Metabolic Panel:  Sodium   Date Value Ref Range Status   06/09/2021 132 (L) 133 - 144 mmol/L Final     Potassium   Date Value Ref Range Status   06/09/2021 5.0 3.4 - 5.3 mmol/L Final     Comment:     Specimen slightly hemolyzed, potassium may be falsely elevated     Chloride   Date Value Ref Range Status   06/09/2021 95 94 - 109 mmol/L Final     Carbon Dioxide   Date Value Ref Range Status   06/09/2021 29 20 - 32 mmol/L Final     Anion Gap   Date Value Ref Range Status   06/09/2021 8 3 - 14 mmol/L Final     Glucose   Date Value Ref Range Status   06/09/2021 81 70 - 99 mg/dL Final     Urea Nitrogen   Date Value Ref Range Status   06/09/2021 26 7 - 30 mg/dL Final     Creatinine   Date Value Ref Range Status   06/09/2021 1.39 (H) 0.66 - 1.25 mg/dL Final     GFR Estimate   Date Value Ref Range Status   06/09/2021 57 (L) >60 mL/min/[1.73_m2] Final     Comment:     Non  GFR Calc  Starting 12/18/2018, serum creatinine based estimated GFR (eGFR) will be   calculated using the Chronic Kidney Disease Epidemiology Collaboration    (CKD-EPI) equation.       Calcium   Date Value Ref Range Status   06/09/2021 9.1 8.5 - 10.1 mg/dL Final       Rehabilitation - continue comprehensive acute inpatient rehabilitation program with multidisciplinary approach including therapies, rehab nursing, and physiatry following. See interval history for updates.      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  He is admitted to ARU on 6/8/21 for rehabilitation and ongoing medical management.     Seizures  Received valium per EMS, ongoing seizures.  Loaded with Keppra, benzodiazepine in ED. Neurology consulted.  Likely precipitated by severe hyponatremia.  Required intubation for airway protection.  EEG consistent with severe encephalopathy consistent with postictal state, sedation, hyponatremia, but no seizure activity observed.  Per neuro, likely will have prolonged recovery time given history of brain tumor and numerous chronic microhemorrhages, large amount of sedation that was initially used, and recovering hyponatremia.  Seizures resolved with stabilization of sodium levels.  Neurology did not feel AEDs indicated given underlying cause (hyponatremia) treated.  - Seizure precautions     Incidental CVA  Noted on brain MRI 5/22 with 2 small infarcts in R temporal lobe.  Neurology following patient and felt these areas were incidental findings, unrelated to his current presentation.  Repeat brain MRI 5/27 with interval increase in size of 2 areas of acute infarct in R temporal lobe.  - Continue PTA ASA  81 mg  - Continue PT/OT/SLP    Acute delirium/encephalopathy   Noted beginning evening of 5/29.  Associated with confusion, agitation, visual hallucinations, combativeness, impulsive, pulling at lines, devices and threatening to hit at staff.  Felt to be likely in the setting of underlying brain pathology from medulloblastoma, significant hyponatremia early hospital course, coexisting temporal stroke.  Transferred to ICU and received Precedex infusion.  Psychiatry consulted, started on scheduled Seroquel and IV valproic acid.  Seroquel has since been weaned down to as needed and delayed release oral Depakote started at bedtime, currently at 250 mg.  No ongoing significant agitation since 6/2.  Elected to defer repeat brain MRI due to contrast (JACK) and mental status now returned to baseline.  - Monitor for recurrence  - Continue Depakote 250 mg delayed-release tablet at bedtime.  Wean as able.  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  Cr jumped from 0.88 to peak of 1.55, down-trending since.  Also suspect possible contribution of reduced oral fluid intake, NPO due to dysphagia on 5/31.  Kidney function improved with IV hydration, though Cr still remains elevated at 1.40 on 6/7.  - Cr stable at 1.39 today (prior 1.40)  - Trend BMP  - Avoid further IV dye for now    Hyponatremia   Severe. Initial sodium 109 with unintentional rapid correction to 123, D5W given with reduction to 120.  Managed by nephrology.  [place] likely related to polydipsia since urine osmolality was not elevated and ADH physiology.  Patient was also on sertraline as outpatient, which was discontinued due to  possible contribution.  Sodium with ongoing improvement, stable at 138.  - Recurrent mild hyponatremia at 132 today.  Recheck tomorrow.  - Trend BMP  - Continue fluid restriction of 1.8 L  - Per nephrology, avoid proton pump inhibitors, ACE inhibitors, thiazide diuretics, and SSRI medications for now.    Possible aspiration pneumonia  Acute respiratory failure with hypoxia, resolved  Required intubation for airway protection.  Extubated on 5/23 with no further airway concerns or hypoxia.  Productive cough initially.  Mild interstitial prominence of right lung base on x-ray.  Sputum culture with MRSA. Sensitivity noted.  Patient treated with IV vancomycin, with stop date of 5/31.  - Monitor respiratory status  - Encourage IS  - Supplemental oxygen PRN    Gastroesophageal reflux  1 episode of emesis night of 6/4 but seemed related to specific food.  Elected to defer GI consult with no clear indication for EGD.  Started on famotidine rather than PPI due to recent significant hyponatremia.  - Continue famotidine 20 mg daily.    Elevated BP  Patient normotensive at discharge to ARU.   - Remains stable, normotensive  - Monitor BP    Nutrition  Dysphagia, resolved  Refused nasogastric tube 5/28.  SLP assessed 5/29 and recommended soft diet.  Discussed with speech therapy 6/2. Dysphagia correlates with declining mental status from encephalopathy.  Speech therapy recommended video swallow for 6/3 with speech to rule out silent aspiration. Results shows multiple episodes of penetration with thin liquid barium. No evidence of aspiration.  Diet upgraded to regular diet, thin liquids by speech therapy on 6/4.  - Continue SLP  - Continue regular diet with thin liquids     Hyperlipidemia  PTA simvastatin held in setting of elevated CK (4034 at admission, felt like secondary to seizures).  - Per discharge summary, check CK in 2 days (6/10), resume simvastatin if back to normal.     Hx PTSD  Follows with psychology, psychiatry as  "outpatient at [facility].  Started on sertraline 25 mg daily on 4/9/21.  This admission, PTA sertraline discontinued in setting of severe hyponatremia.  - Continue to hold sertraline  - Monitor for symptoms  - Follow up with outpatient psychiatry to consider alternative to sertraline for PTSD    Posterior neck pain  Present throughout acute hospitalization, per patient some chronic neck pain which he attributes to prior injury \"tubing\".  Denies radiation.  Palpable tightness in right trapezius and levator muscles, palpation reproduces patient's pain.  Alleviated with lidocaine patch.  - Continue lidocaine patch at night  - Tylenol PRN  - Discussed consideration for follow-up with PM&R as outpatient for injections if ongoing    Left calf pain  Reports new onset 6/9.  Denies injury of previous history of pain in this area.  No warmth, swelling.  Aggravated by weight-bearing.  Tender to palpation in mid/posterior calf, aggravated by dorsiflexion.  Suspect muscular, possible Achilles tendonitis.  - Recommend stretching, trial ice or heat  - Trial [place]  - Monitor      1. Adjustment to disability:  Clinical psychology to eval and treat as indicated  2. FEN: regular diet, thin liquids  3. Bowel: continent, monitor, PRN bowel meds available  4. Bladder: continent, PVRs at admission 21,0,17.  Ongoing monitoring PRN  5. DVT Prophylaxis: mechanical, ambulation  6. GI Prophylaxis: famotidine  7. Code: full  8. Disposition: home  9. ELOS: 12 days  10. Follow up Appointments on Discharge: PCP, psychiatry, neurology?, PM&R        Patient seen and discussed with Dr. David Granados, PM&R Staff Physician    Lorenza Draper PA-C  Physical Medicine & Rehabilitation     "

## 2021-06-09 NOTE — PLAN OF CARE
Discharge Planner Post-Acute Rehab SLP:     Discharge Plan: Home with Outpatient?    Precautions: Fall    Current Status:  Communication: Mildly impaired word-finding at conversation level. Auditory comprehension, reading comprehension, verbal expression appear WFL.  Cognition: Suspect mild-moderate cognitive impairment. Complete standardized cognitive evaluation  Swallow: Regular and thin diet. Do not anticipate further needs.    Assessment: Patient demonstrates below baseline cognitive-linguistic function at this time, and would benefit from SLP skilled services in order to return home at the highest level of independence and return to work safely. Patient demonstrates mild verbal expression impairment at the conversation level, with word-finding impairment. With structured tasks, patient demonstrated WFL naming and sentence completion. Auditory comprehension appears WFL at unstructured conversational level, though does require increased volume and slower rate of speech d/t hearing impairment. Reading comprehension may be mildly impacted by attention with functional reading tasks, though functional at the paragraph level. Cognitively, patient demonstrates slower processing time and mildly impaired thought organization. Unable to complete full cognitive evaluation during today's session, recommend formal, standardized cognitive evaluation be completed. After this evaluation, target goals for word-finding in conversation level tasks. Goals to be addedd for cognition following standardized cognitive evaluation.    Other Barriers to Discharge (Family Training, etc): TBD

## 2021-06-09 NOTE — PROGRESS NOTES
Called pt with DEXA scan and scoliosis results - unremarkable.  Ordered a Bilateral L4-5 facet injection.  Nunu Chapman RN, CNRN

## 2021-06-09 NOTE — PLAN OF CARE
FOCUS/GOAL  Medical management and Safety management    ASSESSMENT, INTERVENTIONS AND CONTINUING PLAN FOR GOAL:    Pt is alert and oriented. Slow to respond and speech w/ some mild word difficulty finding. Set the bed alarm off x2 this shift, was found by staff already walking towards the door looking for the bathroom. Refused gb and walker. Educated about use of devices and safety. Pt has steady gait and no symptoms during transfers. Reminded to use the call light, pt said he coudln't find it. Call light was on the left side by the table. Able to locate the call light and demonstrated how to use it after education. Did call the second time but pt was again already walking towards the bathroom w/ bed alarm on. Independent w/ bed mobility. Continent of bowel and bladder. Ambulated to the bathroom for toileting. PVR's completed. Denies sob, N/V, dizziness or any new weakness. Complained of Mild cramping on left calf area, heat applied w/ good effect. Refused pharmacological intervention when offered. Will continue POC.

## 2021-06-09 NOTE — PLAN OF CARE
FOCUS/GOAL  Medication management, Pain management, Medical management, Reinforcement of self-care/ADL, and Safety management    ASSESSMENT, INTERVENTIONS AND CONTINUING PLAN FOR GOAL:  Pt was A/O, forgetful and minimal word-finding difficulty at times, able to use call light and make needs known to staff. Had pain on posterior neck which was managed with PRN tylenol and lidocaine patch. On regular diet, thin liquids, takes medicines whole. CGA walker with transfers. Cont of BL, no BM this shift, LBM-6/8/2021, PVRs were 21 and 0ml. VSS. Post-PICC line dressing changed. Will continue with current POC.

## 2021-06-09 NOTE — PLAN OF CARE
FOCUS/GOAL  Medical management    ASSESSMENT, INTERVENTIONS AND CONTINUING PLAN FOR GOAL:  Bp 98/73  before morning meds. Compliant with FR intake 500 ml . Used toilet to void several times this shift. Patient is impulsive at times,  Set off alarms 3 times this shift.  Denied pain. Will continue with POC.

## 2021-06-09 NOTE — PHARMACY-MEDICATION REGIMEN REVIEW
Pharmacy Medication Regimen Review  Otoniel Gomez is a 55 year old male who is currently in the Acute Rehab Unit.    Assessment: All medications have an appropriate indications, durations and no unnecessary use was found    Transitions of Care:   - Per discharge note, nephrology recommends avoiding SSRIs, PPIs, thiazide diuretics, and ACE inhibitors for now. PTA sertraline stopped during hospitalization.   - Neurology did not feel AEDs indicated given underlying cause (hyponatremia) treated. Divalproex started for acute delirium/encephalopathy, planned to taper off as able. Last tapered 6/7 from 500 mg HS to 250 mg HS.     Plan:   -continue plan to taper divalproex as able.   -continue current medication regimen     Attending provider will be sent this note for review.  If there are any emergent issues noted above, pharmacist will contact provider directly by phone.      Pharmacy will periodically review the resident's medication regimen for any PRN medications not administered in > 72 hours and discontinue them. The pharmacist will discuss gradual dose reductions of psychopharmacologic medications with interdisciplinary team on a regular basis.    Please contact pharmacy if the above does not answer specific medication questions/concerns.    Background:  A pharmacist has reviewed all medications and pertinent medical history today.  Medications were reviewed for appropriate use and any irregularities found are listed with recommendations.      Current Facility-Administered Medications:      acetaminophen (TYLENOL) tablet 325-650 mg, 325-650 mg, Oral, Q4H PRN, Lorenza Draper PA-C, 325 mg at 06/08/21 1906     aspirin (ASA) chewable tablet 81 mg, 81 mg, Oral, Daily, Lorenza Draper PA-C, 81 mg at 06/09/21 0800     divalproex sodium delayed-release (DEPAKOTE) DR tablet 250 mg, 250 mg, Oral, At Bedtime, Lorenza Draper PA-C, 250 mg at 06/08/21 2106     famotidine (PEPCID) tablet 10 mg, 10 mg, Oral, BID,  Lorenza Draper PA-C, 10 mg at 06/09/21 0800     Lidocaine (LIDOCARE) 4 % Patch 1 patch, 1 patch, Transdermal, Q24h, 1 patch at 06/08/21 2106 **AND** lidocaine patch in PLACE, , Transdermal, Q8H, Lorenza Draper PA-C     melatonin tablet 5 mg, 5 mg, Oral, At Bedtime, Lorenza Draper PA-C, 5 mg at 06/08/21 2106     menthol (Topical Analgesic) 2.5% (BENGAY VANISHING SCENT) 2.5 % topical gel, , Topical, Q6H PRN, Lorenza Draper PA-C     polyethylene glycol (MIRALAX) Packet 17 g, 17 g, Oral, Daily PRN, Lorenza Draper PA-C     senna-docusate (SENOKOT-S/PERICOLACE) 8.6-50 MG per tablet 1 tablet, 1 tablet, Oral, BID PRN, Lorenza Draper PA-C  No current outpatient prescriptions on file.   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

## 2021-06-09 NOTE — PLAN OF CARE
"Discharge Planner Post-Acute Rehab PT:      Discharge Plan: Home with spouse, 5 COMPA with SOPHIE saavedra. OP PT.     Precautions: Falls, seizure?     Current Status:  Bed Mobility: IND  Transfer: CGA without device  Gait: up to 300 ft CGA. Attempted without device-per this writer's opinion, somewhat unsteady with no device and with cane-recommend continuing walker for now.  Stairs: 12x6\" steps with SOPHIE saavedra, reciprocal, CGA  Balance: MURRAY on 6/8: 38/56     Assessment: Pt highly motivated, good participation. Educated pt today to avoid self-transfers. Sessions focused on balance and gait training-do not currently recommend going without walker.     "
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
## 2021-06-09 NOTE — PROGRESS NOTES
Scoliosis xrays were done at [facility]  - radiologist is unable to read scoliosis xrays over there, so will reorder and ask pt to go to either [facility] or [facility].  Nunu Chapman RN, CNRN

## 2021-06-09 NOTE — PROGRESS NOTES
Otoniel Gomez is s/p posterior fossa craniotomy in 2007 for meduloblastoma. Following up with [facility], Dr. Mcgregor.  Last time seen in clinic on 7/21/20115 for his LBP.  Today he returns with an updated MRI scan and Flex/Ext xrays. He presents with a chief complaint bilateral low back pain associated with any prolonged positioning. Denies radicular pain, sensory or motor issues in LE. Gait and balance are normal. Denies incontinence or saddle anesthesia.  Nunu Chapman RN, CNRN
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
## 2021-06-09 NOTE — PROGRESS NOTES
The following XR were ordered to assess for instability prior to appt on 2/23/2017 at 1100.  Nunu Chapman RN, CNRN  .

## 2021-06-09 NOTE — PROGRESS NOTES
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  On evening of admission, 5/21/21, wife found patient unresponsive with seizure activity.  EMS found patient with ongoing seizure activity, valium administered without change.  Patient was intubated for airway protection and transported to [facility] for ongoing evaluation.  In ED, patient no longer seizing, loaded with Keppra 1000 mg IV x1, but found to have severe hyponatremia at 109, and suspected seizures related to hyponatremia, so no ongoing anti-epileptics administered... 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  Imaging revealed two small right temporal infarcts.  No recurrence of brain mass or signs of demyelination.  Neurology did not feel the infarcts were related to patient's clinical presentation, but recommended repeat imaging in several days if no significant improvement.  Neurology did note that patient would likely have prolonged recovery time from seizures given history of brain tumor and numerous chronic microhemorrhages, as well as large amount of sedation at initial presentation, and recovering hyponatremia. Patient was extubated on 5/23, but noted to have increasing agitation.  Repeat MRI brain on 5/26 demonstrated evolution of incidental " "strokes, no new acute changes, no evidence of pontine demyelination... On 5/29, patient became significantly more agitated and delirious, with associated hallucinations and attempts to hit staff.  Code green was called. IM Zyprexa ineffective and worsened during 5/30 AM, requiring transfer to ICU, started on Precedex drip.  Psychiatry consulted and he was started on Seroquel.  Repeat brain MRI considered, but deferred due to JACK and concern for additional IV contrast administration.  With ongoing agitation despite PRN Haldol and IV Zyprexa use, valproate was trialed, and noted improvement with no significant agitation persisting after 6/2.  Seroquel was weaned to PRN and transitioned to delayed release divalproex.\"   General Observations Patient was seen in acute hospital setting for dysphagia, and all goals were met as patient is at baseline status, tolerating a regular texture solids and thin liquids diet.   Disability/Function   Hearing Difficulty or Deaf yes   Patient's preferred means of communication verbal   Describe hearing loss bilateral hearing loss   Use of hearing assistive devices bilateral hearing aids   Wear Glasses or Blind yes   Vision Management glasses   Concentrating, Remembering or Making Decisions Difficulty yes   Difficulty Communicating no   Difficulty Eating/Swallowing no   Walking or Climbing Stairs Difficulty no   Dressing/Bathing Difficulty no   Toileting issues no   Doing Errands Independently Difficulty (such as shopping) no   Equipment Currently Used at Home none   Fall history within last six months no   Change in Functional Status Since Onset of Current Illness/Injury yes   Type of Evaluation   Type of Evaluation Speech, Language, Cognition   Oral Motor   Oral Musculature generally intact   Dentition (Oral Motor) adequate dentition   Facial Symmetry (Oral Motor)   Facial Symmetry (Oral Motor) WNL   Speech   Speech Intelligibility (Motor Speech) WFL;conversational level   Vocal " Loudness (Motor Speech) WNL   Breath Support (Motor Speech) intact   Rate/Prosody (Motor Speech) slow overall rate;monotone speech   Conversational Level, Speech Intelligibility (Motor Speech) intact   Auditory Comprehension   Follows Commands (Auditory Comprehension) WFL;2-step commands;other (see comments)  (hearing impairment requires slow rate of instructions)   Yes/No Questions (Auditory Comprehension) complex questions   Paragraph Comprehension (Auditory Comprehension) other (see comments)  (Suspect impaired d/t memory and hearing impairment vs langua)   Object Identification (Auditory Comprehension) WFL;multiple objects   2 Step, Follows Commands (Auditory Comprehension) intact   Complex Questions (Auditory Comprehension) 75-90% accuracy   Multiple Objects, Identification (Auditory Comprehension) intact   Verbal Expression   Automatic Speech (Verbal Expression) WFL   Confrontational Naming (Verbal Expression) WFL   Responsive Naming (Verbal Expression) semantic/function   Word Finding Skills (Verbal Expression) other (see comments)  (mild impairment at unstructured conversational level)   Conversational Speech (Verbal Expression) sentence level  (mild impairment at unstructured conversational level)   Functional Assessment Scale (Verbal Expression) Mild Impairment   Semantic/Function, Responsive Naming (Verbal Expression) achieved with cues   Sentence Level, Conversational Speech (Verbal Expression) minimal impairment   Reading Comprehension   Comprehension Level (Reading Comprehension) WFL;paragraph level tasks   Functional Reading Tasks (Reading Comprehension) signs/labels   Paragraph Level, Comprehension Level (Reading Comprehension) intact   Signs/Labels, Functional Reading (Reading Comprehension) minimal impairment   Written Language   Written Expression (Written Language) WFL;sentence level   Cognition   Cognitive Status Patient was alert and an active participant   Cognitive Function executive function  deficit;memory deficit   Additional cognitive-linguistic evaluation indicated  Recommended  (Formal evaluation recommended)   Executive Function Deficit (Cognition) minimal deficit;information processing;problem-solving/reasoning   Memory Deficit (Cognition) minimal deficit;working memory   General Therapy Interventions   Planned Therapy Interventions Cognitive Treatment;Language   Cognitive treatment External memory strategy training   Language Verbal expression   SLP Therapy Assessment/Plan   Criteria for Skilled Therapeutic Interventions Met (SLP Eval) yes;treatment indicated   SLP Diagnosis Mild verbal expression impairment at the conversation level.   Rehab Potential (SLP Eval) good, to achieve stated therapy goals   Therapy Frequency (SLP Eval) daily   Predicted Duration of Therapy Intervention (SLP Eval) 3 weeks   Comment, Therapy Assessment/Plan (SLP) Patient demonstrates below baseline cognitive-linguistic function at this time, and would benefit from SLP skilled services in order to return home at the highest level of independence and return to work safely. Patient demonstrates mild verbal expression impairment at the conversation level, with word-finding impairment. With structured tasks, patient demonstrated WFL naming and sentence completion. Auditory comprehension appears WFL at unstructured conversational level, though does require increased volume and slower rate of speech d/t hearing impairment. Reading comprehension may be mildly impacted by attention with functional reading tasks, though functional at the paragraph level. Cognitively, patient demonstrates slower processing time and mildly impaired thought organization. Unable to complete full cognitive evaluation during today's session, recommend formal, standardized cognitive evaluation be completed. After this evaluation, target goals for word-finding in conversation level tasks. Goals to be addedd for cognition following standardized cognitive  evaluation.    Total Evaluation Time   Total Evaluation Time (Minutes) 60  (Evaluation)

## 2021-06-09 NOTE — CONSULTS
21 1500   Living Arrangements   People in home spouse   Able to Return to Prior Arrangements yes   Home Safety   Patient Feels Safe Living in Home? yes   CM/SW Discharge Planning   Expected Discharge Date 21   Patient/Family Anticipates Transition to home;home with family   Concerns to be Addressed denies needs/concerns at this time;no discharge needs identified   Patient/family educated on Medicare website which has current facility and service quality ratings no   Transportation Anticipated family or friend will provide   Anticipated Discharge Disposition (CM/SW) Home;Home Care;Outpatient Rehab (PT, OT, SLP, Cardiac or Pulmonary)   Patient/Family in Agreement with Plan yes       Social Work: Initial Assessment with Discharge Plan    Patient Name: Otoniel Gomez  : 1965  Age: 55 year old  MRN: [id number]  Completed assessment with: Chart review and interview with pt, pt wife and son at bedside  Admitted to ARU: 2021    Presenting Information   Date of SW assessment: 2021  Health Care Directive: Health Care Directive Agent (if patient not able to make decisions)  Primary Health Care Agent: Patient  Secondary Health Care Agent: Pt spouse NOK  Living Situation: , lives with spouse in a home with 5 COMPA and 12 STI. Stairs to the basement, but does not need to access: bed/bath/kitchen/living room all on the first floor. Small dog at home.   Previous Functional Status: Indep w/ ADLs and IADLs PTA. Bilateral hearing loss. Wears glasses. Manages medications and finances at home. Denied falls at home and was driving.  DME available: See therapy evals.   Patient and family understanding of hospitalization: Appropriate and pleasant.   Cultural/Language/Spiritual Considerations: 56 y/o  male, , english-speaking and Nondenominational-lion.       Physical Health  Reason for admission: Otoniel Gomez is a 55 year old male with past medical history of malignant neoplasm of brain  (cerebellar medulloblastoma) s/p craniotomy and tumor resection, chemotherapy in 2007, asthma, hyperlipidemia who presented on 5/21 with seizures and severe hyponatremia requiring intubation for airway protection 5/21-5/23, with hospital course complicated by R temporal lobe infarcts on imaging, JACK, significant encephalopathy and agitation, possible aspiration pneumonia, GERD, and dysphagia.  He is admitted to ARU on 6/8/21 for rehabilitation and ongoing medical management.    Provider Information   Primary Care Physician:   [facility]    [address] [place], [place] [address] [phone]   Cindy Gan MD    [address] [place], [place] [id number] [phone] (Fax)     : None reported    Mental Health/Chemical Dependency:   Diagnosis: PTSD listed in H&P  Alcohol/Tobacco/Narcotis: None reported   Support/Services in Place: Per H&P--Follows with psychology, [facility].  Services Needed/Recommended: Supportive services available by consult (Health Psychology and [facility] services)   Sexuality/Intimacy: Not discussed     Support System  Marital Status: , wife at bedside and supportive.   Family support: Two adult children, Galen and Mary. Contact info added to face sheet. At bedside and supportive.   Other support available: Friend Rey, good support.     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   Provided Education on discharge plan: Yes  Patient agreeable to discharge plan:  Yes  Provided  education and attained signature for Medicare IM and IRF Patient Rights and Privacy Information provided to patient : N/A  Provided patient with [place] [facility] Resources: N/A  Barriers to discharge: None identified at this time     Discharge Recommendations   Disposition: See above   Transportation Needs: Family assistance   Name of Transportation Company and Phone: N/A     Additional comments   Anticipate discharge in about 8 days, pending additional progress. Good support reported. Discharge needs pending, anticipate OP therapy. SW available if needs arise.       Please invite to Care Conference:  Not anticipated     GUY Oquendo, [facility]   Phone: [phone]  I   Pager: [phone]

## 2021-06-10 ENCOUNTER — APPOINTMENT (OUTPATIENT)
Dept: OCCUPATIONAL THERAPY | Facility: CLINIC | Age: 56
End: 2021-06-10
Payer: COMMERCIAL

## 2021-06-10 ENCOUNTER — APPOINTMENT (OUTPATIENT)
Dept: PHYSICAL THERAPY | Facility: CLINIC | Age: 56
End: 2021-06-10
Payer: COMMERCIAL

## 2021-06-10 ENCOUNTER — APPOINTMENT (OUTPATIENT)
Dept: SPEECH THERAPY | Facility: CLINIC | Age: 56
End: 2021-06-10
Payer: COMMERCIAL

## 2021-06-10 LAB
ANION GAP SERPL CALCULATED.3IONS-SCNC: 6 MMOL/L (ref 3–14)
BUN SERPL-MCNC: 27 MG/DL (ref 7–30)
CALCIUM SERPL-MCNC: 9 MG/DL (ref 8.5–10.1)
CHLORIDE SERPL-SCNC: 99 MMOL/L (ref 94–109)
CK SERPL-CCNC: 99 U/L (ref 30–300)
CO2 SERPL-SCNC: 28 MMOL/L (ref 20–32)
CREAT SERPL-MCNC: 1.31 MG/DL (ref 0.66–1.25)
ERYTHROCYTE [DISTWIDTH] IN BLOOD BY AUTOMATED COUNT: 14.5 % (ref 10–15)
GFR SERPL CREATININE-BSD FRML MDRD: 61 ML/MIN/{1.73_M2}
GLUCOSE SERPL-MCNC: 84 MG/DL (ref 70–99)
HCT VFR BLD AUTO: 27.5 % (ref 40–53)
HGB BLD-MCNC: 9 G/DL (ref 13.3–17.7)
MCH RBC QN AUTO: 28 PG (ref 26.5–33)
MCHC RBC AUTO-ENTMCNC: 32.7 G/DL (ref 31.5–36.5)
MCV RBC AUTO: 86 FL (ref 78–100)
PLATELET # BLD AUTO: 233 10E9/L (ref 150–450)
POTASSIUM SERPL-SCNC: 4.6 MMOL/L (ref 3.4–5.3)
RBC # BLD AUTO: 3.21 10E12/L (ref 4.4–5.9)
SODIUM SERPL-SCNC: 133 MMOL/L (ref 133–144)
WBC # BLD AUTO: 5.9 10E9/L (ref 4–11)

## 2021-06-10 PROCEDURE — 999N000125 HC STATISTIC PATIENT MED CONFERENCE < 30 MIN: Performed by: SPEECH-LANGUAGE PATHOLOGIST

## 2021-06-10 PROCEDURE — 99233 SBSQ HOSP IP/OBS HIGH 50: CPT | Performed by: PHYSICAL MEDICINE & REHABILITATION

## 2021-06-10 PROCEDURE — 80048 BASIC METABOLIC PNL TOTAL CA: CPT | Performed by: PHYSICIAN ASSISTANT

## 2021-06-10 PROCEDURE — 999N000150 HC STATISTIC PT MED CONFERENCE < 30 MIN: Performed by: PHYSICAL THERAPIST

## 2021-06-10 PROCEDURE — 999N000125 HC STATISTIC PATIENT MED CONFERENCE < 30 MIN: Performed by: OCCUPATIONAL THERAPIST

## 2021-06-10 PROCEDURE — 128N000003 HC R&B REHAB

## 2021-06-10 PROCEDURE — 85027 COMPLETE CBC AUTOMATED: CPT | Performed by: PHYSICIAN ASSISTANT

## 2021-06-10 PROCEDURE — 97535 SELF CARE MNGMENT TRAINING: CPT | Mod: GO | Performed by: OCCUPATIONAL THERAPIST

## 2021-06-10 PROCEDURE — 97535 SELF CARE MNGMENT TRAINING: CPT | Mod: GO

## 2021-06-10 PROCEDURE — 82550 ASSAY OF CK (CPK): CPT | Performed by: PHYSICIAN ASSISTANT

## 2021-06-10 PROCEDURE — 36415 COLL VENOUS BLD VENIPUNCTURE: CPT | Performed by: PHYSICIAN ASSISTANT

## 2021-06-10 PROCEDURE — 250N000013 HC RX MED GY IP 250 OP 250 PS 637: Performed by: PHYSICIAN ASSISTANT

## 2021-06-10 PROCEDURE — 97112 NEUROMUSCULAR REEDUCATION: CPT | Mod: GP | Performed by: PHYSICAL THERAPIST

## 2021-06-10 PROCEDURE — 250N000013 HC RX MED GY IP 250 OP 250 PS 637: Performed by: PHYSICAL MEDICINE & REHABILITATION

## 2021-06-10 PROCEDURE — 96125 COGNITIVE TEST BY HC PRO: CPT | Mod: GN | Performed by: SPEECH-LANGUAGE PATHOLOGIST

## 2021-06-10 RX ORDER — SIMVASTATIN 20 MG
20 TABLET ORAL AT BEDTIME
Status: DISCONTINUED | OUTPATIENT
Start: 2021-06-10 | End: 2021-06-18 | Stop reason: HOSPADM

## 2021-06-10 RX ADMIN — ASPIRIN 81 MG CHEWABLE TABLET 81 MG: 81 TABLET CHEWABLE at 08:25

## 2021-06-10 RX ADMIN — Medication 5 MG: at 21:34

## 2021-06-10 RX ADMIN — FAMOTIDINE 10 MG: 10 TABLET ORAL at 19:43

## 2021-06-10 RX ADMIN — LIDOCAINE 1 PATCH: 560 PATCH PERCUTANEOUS; TOPICAL; TRANSDERMAL at 19:26

## 2021-06-10 RX ADMIN — DIVALPROEX SODIUM 250 MG: 250 TABLET, DELAYED RELEASE ORAL at 21:34

## 2021-06-10 RX ADMIN — FAMOTIDINE 10 MG: 10 TABLET ORAL at 08:25

## 2021-06-10 RX ADMIN — SIMVASTATIN 20 MG: 20 TABLET, FILM COATED ORAL at 21:47

## 2021-06-10 NOTE — PLAN OF CARE
FOCUS/GOAL  Medical management    ASSESSMENT, INTERVENTIONS AND CONTINUING PLAN FOR GOAL:    Pt is alert and oriented. Make needs known. Used the call light tonight for toileting. However got up even before staff was able to come in to assist pt. Denies pain, sob, N/V and any new weakness. CGAo1 in transfers using the walker and gb. Continent in bowel and bladder. Ambulates to the bathroom for toileting. Sleeping during rounds. Bed alarm on, call light in reach. Will continue POC.

## 2021-06-10 NOTE — PLAN OF CARE
FOCUS/GOAL  Medical management    ASSESSMENT, INTERVENTIONS AND CONTINUING PLAN FOR GOAL:  Patient is impulsive, he self transferred to the bathroom, set off alarms several times this shift. Redirected each time without  any positive result due to forgetfulness. Request submitted to put him close to the nursing station. Patient keep trying to get more water although  fluid restriction.  He keep asking water or ice to  different people. 300 ml given. Denied pain, will continue with POC.

## 2021-06-10 NOTE — PLAN OF CARE
Discharge Planner Post-Acute Rehab SLP:     Discharge Plan: Home with Outpatient?    Precautions: Fall    Current Status:  Communication: Mildly impaired word-finding at conversation level. Auditory comprehension, reading comprehension, verbal expression appear WFL.  Cognition: Suspect mild-moderate cognitive impairment. Complete standardized cognitive evaluation  Swallow: Regular and thin diet. Do not anticipate further needs.    Assessment: Patient demonstrates below baseline cognitive-linguistic function at this time, and would benefit from SLP skilled services in order to return home and potentially work at the highest level of independence and safety. Patient completed formal cognitive evaluation, RBANS form B, scoring with moderate-severe impairments with immediate memory, attention, delayed memory, and language. Language score related to generative naming in a category. Naming portion of test WFL. Patient also impulsive with regards to safety awareness. Will address goals targeting simple/moderate problem solving related to work tasks, short-term recall, and sustained/alternating/selective attention.    Repeatable Battery for the Assessment of Neuropsychological Status (RBANS) FORM    Immediate Memory Visuospatial/  Constructional Language Attention Delayed Memory Total Scale   Index Score 81 121 79 72 64 417   Percentile Rank 10 92 8 3 1 8     SLP:  Pt seen for administration of RBANS. Results are based on a mean of 100 and a standard deviation of +/- 15.   Interpretation: Moderate-severe cognitive-linguistic impairment  Face to Face Administration: 40  Scoring/Interpretation: 20  Total Time: 60        Other Barriers to Discharge (Family Training, etc): TBD

## 2021-06-10 NOTE — PLAN OF CARE
FOCUS/GOAL  Medication management, Pain management, Medical management, Reinforcement of self-care/ADL, and Safety management    ASSESSMENT, INTERVENTIONS AND CONTINUING PLAN FOR GOAL:  Pt was A/O, forgetful and with minimal word-finding difficulty at times, able to use call light and make needs known to staff. Had pain on posterior neck, given with PRN tylenol and lidocaine patch and were effective per report. On regular diet, thin liquids, takes medicines whole, on 1800ml/day fluid restriction. CGA walker with transfers. Cont of BL, no BM this shift, LBM-6/8/2021. Post-PICC line site ROBB. Will continue with current POC.

## 2021-06-10 NOTE — PROGRESS NOTES
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  On evening of admission, 5/21/21, wife found patient unresponsive with seizure activity.  EMS found patient with ongoing seizure activity, valium administered without change.  Patient was intubated for airway protection and transported to [facility] for ongoing evaluation.  In ED, patient no longer seizing, loaded with Keppra 1000 mg IV x1, but found to have severe hyponatremia at 109, and suspected seizures related to hyponatremia, so no ongoing anti-epileptics administered... 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  Imaging revealed two small right temporal infarcts.  No recurrence of brain mass or signs of demyelination.  Neurology did not feel the infarcts were related to patient's clinical presentation, but recommended repeat imaging in several days if no significant improvement.  Neurology did note that patient would likely have prolonged recovery time from seizures given history of brain tumor and numerous chronic microhemorrhages, as well as large amount of sedation at initial presentation, and recovering hyponatremia. Patient was extubated on 5/23, but noted to have increasing agitation.  Repeat MRI brain on 5/26 demonstrated evolution of incidental " "strokes, no new acute changes, no evidence of pontine demyelination... On 5/29, patient became significantly more agitated and delirious, with associated hallucinations and attempts to hit staff.  Code green was called. IM Zyprexa ineffective and worsened during 5/30 AM, requiring transfer to ICU, started on Precedex drip.  Psychiatry consulted and he was started on Seroquel.  Repeat brain MRI considered, but deferred due to JACK and concern for additional IV contrast administration.  With ongoing agitation despite PRN Haldol and IV Zyprexa use, valproate was trialed, and noted improvement with no significant agitation persisting after 6/2.  Seroquel was weaned to PRN and transitioned to delayed release divalproex.\"   General Observations Patient was seen in acute hospital setting for dysphagia, and all goals were met as patient is at baseline status, tolerating a regular texture solids and thin liquids diet.   Disability/Function   Hearing Difficulty or Deaf yes   Patient's preferred means of communication verbal   Describe hearing loss bilateral hearing loss   Use of hearing assistive devices bilateral hearing aids   Wear Glasses or Blind yes   Vision Management glasses   Type of Evaluation   Type of Evaluation Speech, Language, Cognition   Oral Motor   Oral Musculature generally intact   Dentition (Oral Motor) adequate dentition   Facial Symmetry (Oral Motor)   Facial Symmetry (Oral Motor) WNL   Cognition   Cognitive Status Patient alert and willing to participate.   Cognitive Function attention deficit;executive function deficit;memory deficit   Additional cognitive-linguistic evaluation indicated  Completed   Cognitive Status Exam Comments RBANS total scale score in 8th percentile.   Executive Function Deficit (Cognition) severe deficit   Memory Deficit (Cognition) moderate deficit   Attention Deficit (Cognition) moderate deficit   General Therapy Interventions   Planned Therapy Interventions Cognitive " Treatment;Language   Cognitive treatment External memory strategy training   Language Verbal expression   SLP Therapy Assessment/Plan   Criteria for Skilled Therapeutic Interventions Met (SLP Eval) yes;treatment indicated   SLP Diagnosis Moderate-severe Cognitive impairment.   Rehab Potential (SLP Eval) good, to achieve stated therapy goals   Therapy Frequency (SLP Eval) daily   Predicted Duration of Therapy Intervention (SLP Eval) 2 weeks   Comment, Therapy Assessment/Plan (SLP) Patient demonstrates below baseline cognitive-linguistic function at this time, and would benefit from SLP skilled services in order to return home and potentially work at the highest level of independence and safety. Patient completed formal cognitive evaluation, RBANS form B, scoring with moderate-severe impairments with immediate memory, attention, delayed memory, and language. Language score related to generative naming in a category. Naming portion of test WFL. Patient also impulsive with regards to safety awareness. Will address goals targeting simple/moderate problem solving related to work tasks, short-term recall, and sustained/alternating/selective attention.   Therapy Plan Review/Discharge Plan (SLP)   Therapy Plan Review (SLP) evaluation/treatment results reviewed;care plan/treatment goals reviewed;risks/benefits reviewed;current/potential barriers reviewed;participants voiced agreement with care plan;participants included;patient    Total Evaluation Time   Total Evaluation Time (Minutes) 60  (Cognitive evaluation)

## 2021-06-10 NOTE — PLAN OF CARE
Individualized Overall Plan Of Care (IOPOC)      Rehab diagnosis/Impairment Group Code: 2.1 Encephalopathy - changed from 01.4 because his function seems to be more affected by his encephalopathy than stroke. See progress note and neurology team assessment for details.       Expected functional outcome: anticipate improvement to mod I level with mobility and ADLs but will need supervision due to cognitive and linguistic deficits     Clinical Impression Comments:   Mobility: Pt presents to PT with impaired balance, LE coordination s/p hospitalization for severe hyponatremia with seizures, with PMH significant for cerebellar medulloblastoma resection 12 years prior. Pt should benefit well from intensive skilled PT during ARU stay to discharge home with spouse, IND.    ADL: The pt will benefit from skilled OT intervention to address goals in POC and maximize functional IND and safety in discharge environment.    Communication/Cognition/Swallow: Patient demonstrates below baseline cognitive-linguistic function at this time, and would benefit from SLP skilled services in order to return home and potentially work at the highest level of independence and safety. Patient completed formal cognitive evaluation, RBANS form B, scoring with moderate-severe impairments with immediate memory, attention, delayed memory, and language. Language score related to generative naming in a category. Naming portion of test WFL. Patient also impulsive with regards to safety awareness. Will address goals targeting simple/moderate problem solving related to work tasks, short-term recall, and sustained/alternating/selective attention.     Intensity of therapy:   PT 60 minutes, Daily, for 7 days  OT 60 minutes, Daily, for 7-10 days  SLP 60 minutes, daily, for 2 weeks    Orthotics none  Education safety and fall risk   Neuropsychology Testing: No        Medical Prognosis: his recovery might take longer than expected due to prior h/o brain tumor  and some baseline cognitive deficits. Will need close f/u with neurology team for more evaluation pending his course.     Physician summary statement: he has excellent support and will get home in one week.     Discharge destination: prior home and family  Discharge rehabilitation needs: outpatient, PT, OT and SLP      Estimated length of stay: 7 days       Rehabilitation Physician Alivia Epps MD

## 2021-06-10 NOTE — PLAN OF CARE
"Discharge Planner Post-Acute Rehab PT:      Discharge Plan: Home with spouse, 5 COMPA with R quentin. OP PT.     Precautions: Falls, Seizure     Current Status:  Bed Mobility: IND  Transfer: CGA without device  Gait: up to 300 ft CGA. Recommend training without device in therapy, use of FWW with nursing for now.  Stairs: 12x6\" steps with R rail, reciprocal, CGA  Balance: MURRAY on 6/8: 38/56     Assessment:  Primary focus on static/dynamic standing balance to improve safety with mobility and reduce risk of falls, as patient with noted tendency to self-transfer/forget to call for assistance when getting up). Trialed use of decompressive Ktape for pain relief, as patient with TTP L distal gastroc/superior soleus, most consistent with muscular strain and possible tendonitis. Encouraged to remove if itching present.     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

## 2021-06-10 NOTE — PLAN OF CARE
Acute Rehab Care Conference/Team Rounds      Type: Team Rounds    Present: Dr. Alivia Epps, Lorenza Draper PA, Cynthia Pinto RN,  Rashel Stern PT, Munira Salazar OT, Angel Giang SLP, Aida Madsen [facility], Kalyn Woods , Meg Lauren Dietician, Patient Otoniel Gomez      Discharge Barriers/Treatment/Education    Rehab Diagnosis: encephalopathy, CVA, seizures     Active Medical Co-morbidities/Prognosis: h/o cerebellar medulloblastoma, hyponatremia, HLD, dysphagia, GERD, PNA    Safety: Patient is alert and oriented. Uses the call light for needs however does not wait for staff assistance. Would attempt to get up at night and set the bed alarm off. Contact precaution maintained.     Pain: Denies. Lidocaine patch in place on posterior neck which is effective in managing pain.    Medications, Skin, Tubes/Lines: Takes pills whole. Scar on occipital area from previous brain surgery years ago. No line or tubes.     Swallowing/Nutrition: Regular textures and Thin liquids.    Bowel/Bladder: Continent of bowel and bladder. LBM 6/8. Ambulates to the bathroom for toileting.     Psychosocial: , has two adult children. Works desk job, light duty as a . Indep PTA. PTSD reported. No substance abuse or financial concerns.     ADLs/IADLs: Initial OT evaluation completed 6/9. Pt requiring SBA transfers/mobility with fww, CGA without device. Pt requiring CGA ADLs. Performance with ADLs significantly below baseline, impacted by impaired balance, weakness, and cognitive/communication deficits. Functionally, demonstrates deficits with recall, problem solving, attention, and insight. Goals to advance to IND ADLs, anticipate pt will require increased assistance with IADLs due to cognitive deficits. ELOS 7-10 days with OP OT follow up. Recommend caregiver training prior to discharge. DME/AE: shower chair, bathroom grab bars, possible fww.     Mobility:   Bed Mobility: IND  Transfer: CGA without  "device  Gait: up to 300 ft CGA with FWW  Stairs: 12x6\" steps with R rail, reciprocal, CGA  Balance: MURRAY on 6/8: 38/56  Pt presents to PT with impaired balance, LE coordination s/p hospitalization for severe hyponatremia with seizures, with PMH significant for cerebellar medulloblastoma resection 12 years prior. Pt should benefit well from intensive skilled PT during ARU stay to discharge home with spouse, IND.    Cognition/Language: Patient demonstrates mild verbal expression impairment at the unstructured conversation level, with word-finding impairment. With structured tasks, patient demonstrated WFL naming and sentence completion. Auditory comprehension appears WFL at unstructured conversational level, though does require increased volume and slower rate of speech d/t hearing impairment. Reading comprehension may be mildly impacted by attention with functional reading tasks, though functional at the paragraph level. Cognitively, patient demonstrates slower processing time and mildly impaired thought organization. Unable to complete full cognitive evaluation during initial evaluation session - formal, standardized cognitive evaluation be completed. Goals for cognition to be added after cognitive evaluation completed.    Community Re-Entry: Plan to continue with OP PT to address balance, coordination impairments.    Transportation: Family to provide. CGA for car transfers without device.    Decision maker: self    Plan of Care and goals reviewed and updated.    Discharge Plan/Recommendations    Fall Precautions: continue    Overall plan for the patient: early in his rehab course. His wife and son confirmed that he is definitely below baseline in terms of his cognitive and linguistic skills. He has also been impulsive with poor insight. Will transfer him to a room closer to the  this afternoon. Bed and chair alarms on at all times. Anticipate one more week at ARU before discharge. Family training next " week. He might need 24/7 supervision at least initially after discharge.       Utilization Review and Continued Stay Justification    Medical Necessity Criteria:    For any criteria that is not met, please document reason and plan for discharge, transfer, or modification of plan of care to address.    Requires intensive rehabilitation program to treat functional deficits?: Yes    Requires 3x per week or greater involvement of rehabilitation physician to oversee rehabilitation program?: Yes    Requires rehabilitation nursing interventions?: Yes    Patient is making functional progress?: Yes    There is a potential for additional functional progress? Yes    Patient is participating in therapy 3 hours per day a minimum of 5 days per week or 15 hours per week in 7 day period?:Yes    Has discharge needs that require coordinated discharge planning approach?:Yes          Final Physician Sign off    Statement of Approval: I agree with all the recommendations detailed in this document.      Patient Goals  Social Work Goals: Confirm discharge recommendations with therapy, coordinate safe discharge plan and remain available to support and assist as needed.    OT Frequency: 60-90 min daily  OT goal: hygiene/grooming: independent  OT goal: upper body dressing: Independent  OT goal: lower body dressing: Modified independent  OT goal: upper body bathing: Supervision/stand-by assist  OT goal: lower body bathing: Supervision/stand-by assist  OT goal: toilet transfer/toileting: Modified independent  OT goal: meal preparation: Supervision/stand-by assist  OT goal: home management: Supervision/stand-by assist  OT goal 1: Pt will demo IND with BUE strengthening and endurance HEP.  OT goal 2: Pt will demo SBA shower transfer using DME/AE prn    PT Frequency: Daily x60-90 min  PT goal: transfers: Independent, Sit to/from stand, Bed to/from chair  PT goal: gait: Independent(500+ ft without device including turning and on uneven  surfaces)  PT goal: stairs: Modified independent, 5 stairs, Rail on right   PT goal 1: Pt will be independent with standing balance, LE coordination HEP to further reduce risk of falls.  PT Goal 2: Pt will be able to perform car transfer IND in order to safely discharge home and access medical appts.  PT Goal 3: Pt will be able to perform floor>furniture transfer with SBA as part of fall recovery training.     SLP Frequency: daily  SLP goal 1: Patient will implement word-finding strategies at the conversational level with rare (0-24%) auditory cues to increase functional expression of wants and needs and expression during work-related tasks.  More SLP goals to be added post-formal cognitive evaluation.      Nursing goals   Monitor and follow fluid restriction  Fall risk and safety  Nutrition   Bowel and bladder care

## 2021-06-10 NOTE — PROGRESS NOTES
06/09/21 0933   Quick Adds   Type of Visit Initial Occupational Therapy Evaluation       Present no   Language English   Living Environment   People in home spouse   Current Living Arrangements house   Home Accessibility stairs to enter home;stairs within home   Number of Stairs, Main Entrance 5   Number of Stairs, Within Home, Primary none  (stairs to basement, does not need to access.)   Transportation Anticipated family or friend will provide   Living Environment Comments OT: Pt lives with spouse in house. 5 COMPA with R rail. Stairs to the basement, but does not need to access: bed/bath/kitchen/living room all on the first floor. Bathroom: walk in shower, no rails or chair. Standard toilet, no rails. Bedroom set up: queen bed, electric bed with height adjustments and HOB adjustments. Spouse works full time. Children also work but are available to assist as needed.    Self-Care   Usual Activity Tolerance good   Current Activity Tolerance moderate   Regular Exercise Yes   Equipment Currently Used at Home none   Activity/Exercise/Self-Care Comment OT: Reports exercising on a daily basis, mix of cardio and weight lifting. Pt is a , reports completing light desk duty since tumor resection 12 years ago.   Disability/Function   Hearing Difficulty or Deaf yes   Patient's preferred means of communication verbal   Describe hearing loss bilateral hearing loss   Use of hearing assistive devices bilateral hearing aids   Wear Glasses or Blind yes   Vision Management glasses   Concentrating, Remembering or Making Decisions Difficulty yes   Difficulty Communicating no   Difficulty Eating/Swallowing no   Walking or Climbing Stairs Difficulty no   Dressing/Bathing Difficulty no   Toileting issues no   Doing Errands Independently Difficulty (such as shopping) no   Fall history within last six months no   Change in Functional Status Since Onset of Current Illness/Injury yes   General Information    Onset of Illness/Injury or Date of Surgery 05/20/21   Referring Physician David Granados, DO   Patient/Family Therapy Goal Statement (OT) Pt goal to walk without a walker and improve cognition.    Additional Occupational Profile Info/Pertinent History of Current Problem Otoniel Gomez is a 55 year old male with past medical history of 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  He is admitted to ARU on 6/8/21 for rehabilitation and ongoing medical management.   Performance Patterns (Routines, Roles, Habits) Pt , father of 2 children, working, IND ADLs/IADLs, driving.    Existing Precautions/Restrictions fall   Limitations/Impairments safety/cognitive;visual;hearing   Left Upper Extremity (Weight-bearing Status) full weight-bearing (FWB)   Right Upper Extremity (Weight-bearing Status) full weight-bearing (FWB)   Left Lower Extremity (Weight-bearing Status) full weight-bearing (FWB)   Right Lower Extremity (Weight-bearing Status) full weight-bearing (FWB)   Heart Disease Risk Factors Medical history   Cognitive Status Examination   Orientation Status person;time   Affect/Mental Status (Cognitive) flat/blunted affect   Follows Commands increased processing time needed;delayed response/completion   Safety Deficit insight into deficits/self-awareness;problem-solving   Memory Deficit moderate deficit   Cognitive Status Comments Scoring 18/30 on SLUMs. Mild word finding difficulties. Craig, does require loud/clear and slow verbalizations.    Visual Perception   Visual Impairment/Limitations corrective lenses full-time   Visual Motor  Impairment saccades;visual tracking, left;visual tracking, right   Impact of Vision Impairment on Function (Vision) Horizontal corrective saccades with lateral eye movement.   Sensory   Sensory Quick Adds No deficits were identified   Pain Assessment   Patient Currently in Pain No   Integumentary/Edema   Integumentary/Edema Comments Large incision along cervical spine from brain tumor removal surgery 10+ yrs ago   Posture   Posture forward head position   Range of Motion Comprehensive   Comment, General Range of Motion BUE AROM WFL   Strength Comprehensive (MMT)   Comment, General Manual Muscle Testing (MMT) Assessment BUE strength 4/5, deconditioned   Muscle Tone Assessment   Muscle Tone Quick Adds No deficits were identified   Coordination   Upper Extremity Coordination Left UE impaired;Right UE impaired   Gross Motor Coordination Mild dysmetria with finger to nose and alternating movements   ARC Assessment Only   Acute Rehab Functional Assessment See IP Rehab Daily Documentation Flowsheet for Functional Mobility/ADL Assessment   Bed Mobility   Comment (Bed Mobility) SBA supine <> EOB.   Transfers   Transfer Comments CGA SPT with and without device.   Balance   Balance Assessment standing balance: static;standing balance: dynamic;system impairment contributing to balance disturbance   Standing Balance: Static good balance   Standing Balance: Dynamic poor balance   System Impairments Contributing to Balance Disturbance cognitive;vestibular;visual;neuromuscular   Instrumental Activities of Daily Living (IADL)   Previous Responsibilities meal prep;housekeeping;laundry;medication management;driving;work;finances;yardwork   Clinical Impression   Criteria for Skilled Therapeutic Interventions Met (OT) yes   OT Diagnosis Decreased IND ADLs/IADLs.   OT Problem List-Impairments impacting ADL problems related to;activity tolerance impaired;balance;cognition;coordination;strength;mobility;inability to direct their own  care;communication;hearing;postural control;vision   ADL comments/analysis Significantly below baseline for functional performance with ADLs/IADLs.   Assessment of Occupational Performance 5 or more Performance Deficits   Identified Performance Deficits Performance deficits include transfers, mobility, dressing, grooming, bathing, toileting, meal prep, household management, community transportation, social skills, work.    Planned Therapy Interventions (OT) ADL retraining;IADL retraining;balance training;cognition;bed mobility training;ROM;neuromuscular re-education;motor coordination training;strengthening;stretching;transfer training;visual perception;home program guidelines;progressive activity/exercise   Clinical Decision Making Complexity (OT) moderate complexity   Therapy Frequency (OT) Daily   Predicted Duration of Therapy 7-10 days   Anticipated Equipment Needs Upon Discharge (OT) reacher;shower chair;walker, rolling   Risk & Benefits of therapy have been explained evaluation/treatment results reviewed;care plan/treatment goals reviewed;risks/benefits reviewed;current/potential barriers reviewed;participants voiced agreement with care plan;participants included;patient   Comment-Clinical Impression The pt will benefit from skilled OT intervention to address goals in POC and maximize functional IND and safety in discharge environment.   Total Evaluation Time (Minutes)   Total Evaluation Time (Minutes) 30

## 2021-06-10 NOTE — PROGRESS NOTES
"  [facility]   Acute Rehabilitation Unit  Daily progress note    INTERVAL HISTORY  Otoniel Gomez was seen during rounds today. Early in his rehab course. His wife and son confirmed that he is definitely below baseline in terms of his cognitive and linguistic skills. He has also been impulsive with poor insight. Will transfer him to a room closer to the  this afternoon. Bed and chair alarms on at all times. Anticipate one more week at ARU before discharge. Family training next week. He might need 24/7 supervision at least initially after discharge.         MEDICATIONS    aspirin  81 mg Oral Daily     divalproex sodium delayed-release  250 mg Oral At Bedtime     famotidine  10 mg Oral BID     lidocaine  1 patch Transdermal Q24h    And     lidocaine   Transdermal Q8H     melatonin  5 mg Oral At Bedtime     simvastatin  20 mg Oral At Bedtime        acetaminophen, menthol (Topical Analgesic) 2.5%, polyethylene glycol, senna-docusate     PHYSICAL EXAM  /64 (BP Location: Right arm)   Pulse 92   Temp 96.2  F (35.7  C) (Oral)   Resp 16   Ht 1.676 m (5' 6\")   Wt 70.1 kg (154 lb 9.6 oz)   SpO2 96%   BMI 24.95 kg/m      Gen: NAD, sitting in chair   Pulm: non-labored on room air  Ext: no clear edema in bilateral lower extremities    Neuro/MSK: was deferred for conversation today       LABS  Lab Results   Component Value Date    WBC 6.2 06/09/2021     Lab Results   Component Value Date    RBC 3.65 06/09/2021     Lab Results   Component Value Date    HGB 10.1 06/09/2021     Lab Results   Component Value Date    HCT 32.0 06/09/2021     Lab Results   Component Value Date    MCV 88 06/09/2021     Lab Results   Component Value Date    MCH 27.7 06/09/2021     Lab Results   Component Value Date    MCHC 31.6 06/09/2021     Lab Results   Component Value Date    RDW 14.4 06/09/2021     Lab Results   Component Value Date     06/09/2021     Last Comprehensive Metabolic " Panel:  Sodium   Date Value Ref Range Status   06/10/2021 133 133 - 144 mmol/L Final     Potassium   Date Value Ref Range Status   06/10/2021 4.6 3.4 - 5.3 mmol/L Final     Chloride   Date Value Ref Range Status   06/10/2021 99 94 - 109 mmol/L Final     Carbon Dioxide   Date Value Ref Range Status   06/10/2021 28 20 - 32 mmol/L Final     Anion Gap   Date Value Ref Range Status   06/10/2021 6 3 - 14 mmol/L Final     Glucose   Date Value Ref Range Status   06/10/2021 84 70 - 99 mg/dL Final     Urea Nitrogen   Date Value Ref Range Status   06/10/2021 27 7 - 30 mg/dL Final     Creatinine   Date Value Ref Range Status   06/10/2021 1.31 (H) 0.66 - 1.25 mg/dL Final     GFR Estimate   Date Value Ref Range Status   06/10/2021 61 >60 mL/min/[1.73_m2] Final     Comment:     Non  GFR Calc  Starting 12/18/2018, serum creatinine based estimated GFR (eGFR) will be   calculated using the Chronic Kidney Disease Epidemiology Collaboration   (CKD-EPI) equation.       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  He is admitted to ARU on 6/8/21 for rehabilitation and ongoing medical management.       Rehabilitation - continue comprehensive acute inpatient rehabilitation program with multidisciplinary approach including therapies, rehab nursing, and physiatry following. See interval history for updates.     Medical issues   Seizures  Received valium per EMS, ongoing seizures.   Loaded with Keppra, benzodiazepine in ED. Neurology consulted.  Likely precipitated by severe hyponatremia.  Required intubation for airway protection.  EEG consistent with severe encephalopathy consistent with postictal state, sedation, hyponatremia, but no seizure activity observed.  Per neuro, likely will have prolonged recovery time given history of brain tumor and numerous chronic microhemorrhages, large amount of sedation that was initially used, and recovering hyponatremia.  Seizures resolved with stabilization of sodium levels.  Neurology did not feel AEDs indicated given underlying cause (hyponatremia) treated.  - Seizure precautions     Incidental CVA  Noted on brain MRI 5/22 with 2 small infarcts in R temporal lobe.  Neurology following patient and felt these areas were incidental findings, unrelated to his current presentation.  Repeat brain MRI 5/27 with interval increase in size of 2 areas of acute infarct in R temporal lobe.  - Continue PTA ASA 81 mg  - Continue PT/OT/SLP    Acute delirium/encephalopathy   Noted beginning evening of 5/29.  Associated with confusion, agitation, visual hallucinations, combativeness, impulsive, pulling at lines, devices and threatening to hit at staff.  Felt to be likely in the setting of underlying brain pathology from medulloblastoma, significant hyponatremia early hospital course, coexisting temporal stroke.  Transferred to ICU and received Precedex infusion.  Psychiatry consulted, started on scheduled Seroquel and IV valproic acid.  Seroquel has since been weaned down to as needed and delayed release oral Depakote started at bedtime, currently at 250 mg.  No ongoing significant agitation since 6/2.  Elected to defer repeat brain MRI due to contrast (JACK) and mental status now returned to baseline.  - Monitor for recurrence  - Continue Depakote 250 mg delayed-release tablet at bedtime.  Wean as able.  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  Cr jumped from 0.88 to peak of 1.55, down-trending since.  Also suspect possible contribution of reduced oral fluid intake, NPO due to dysphagia on 5/31.  Kidney function improved with IV hydration, though Cr still remains elevated at 1.40 on 6/7.  - Cr trending down   - Trend BMP  - Avoid further IV dye for now    Hyponatremia   Severe. Initial sodium 109 with unintentional rapid correction to 123, D5W given with reduction to 120.  Managed by nephrology.  [place] likely related to polydipsia since urine osmolality was not elevated and ADH physiology.  Patient was also on sertraline as outpatient, which was discontinued due to possible contribution.  Sodium with ongoing improvement, stable at 138.  - Na has been overall stable  - Trend BMP  - Continue fluid restriction of 1.8 L; dietitian will provide education as he will go home on FR   - Per nephrology, avoid proton pump inhibitors, ACE inhibitors, thiazide diuretics, and SSRI medications for now.    Possible aspiration pneumonia  Acute respiratory failure with hypoxia, resolved  Required intubation for airway protection.  Extubated on 5/23 with no further airway concerns or hypoxia.  Productive cough initially.  Mild interstitial prominence of right lung base on x-ray.  Sputum culture with MRSA. Sensitivity noted.  Patient treated with IV vancomycin, with stop date of 5/31.  - Monitor respiratory status  - Encourage IS  - Supplemental oxygen PRN    Gastroesophageal reflux  1 episode of emesis night of 6/4 but seemed related to specific food.  Elected to defer GI consult with no clear indication for EGD.  Started on famotidine rather than PPI due to  "recent significant hyponatremia.  - Continue famotidine 20 mg daily.    Elevated BP  Patient normotensive at discharge to ARU.   - Remains stable, normotensive  - Monitor BP    Nutrition  Dysphagia, resolved  Refused nasogastric tube 5/28.  SLP assessed 5/29 and recommended soft diet.  Discussed with speech therapy 6/2. Dysphagia correlates with declining mental status from encephalopathy.  Speech therapy recommended video swallow for 6/3 with speech to rule out silent aspiration. Results shows multiple episodes of penetration with thin liquid barium. No evidence of aspiration.  Diet upgraded to regular diet, thin liquids by speech therapy on 6/4.  - Continue SLP  - Continue regular diet with thin liquids     Hyperlipidemia  PTA simvastatin held in setting of elevated CK (4034 at admission, felt like secondary to seizures).  - Per discharge summary, check CK in 2 days (6/10), resume simvastatin if back to normal. 06/10/21 CK wnl; resumed simvastatin.      Hx PTSD  Follows with psychology, psychiatry as outpatient at [facility].  Started on sertraline 25 mg daily on 4/9/21.  This admission, PTA sertraline discontinued in setting of severe hyponatremia.  - Continue to hold sertraline  - Monitor for symptoms  - Follow up with outpatient psychiatry to consider alternative to sertraline for PTSD    Posterior neck pain  Present throughout acute hospitalization, per patient some chronic neck pain which he attributes to prior injury \"tubing\".  Denies radiation.  Palpable tightness in right trapezius and levator muscles, palpation reproduces patient's pain.  Alleviated with lidocaine patch.  - Continue lidocaine patch at night  - Tylenol PRN  - Discussed consideration for follow-up with PM&R as outpatient for injections if ongoing    Left calf pain  Reports new onset 6/9.  Denies injury of previous history of pain in this area.  No warmth, swelling.  Aggravated by weight-bearing.  Tender to palpation in mid/posterior " calf, aggravated by dorsiflexion.  Suspect muscular, possible Achilles tendonitis.  - Recommend stretching, trial ice or heat  - Trial Beng[place]  - Monitor      1. Adjustment to disability:  Clinical psychology to eval and treat as indicated  2. FEN: regular diet, thin liquids  3. Bowel: continent, monitor, PRN bowel meds available  4. Bladder: continent, PVRs at admission 21,0,17.  Ongoing monitoring PRN  5. DVT Prophylaxis: mechanical, ambulation  6. GI Prophylaxis: famotidine  7. Code: full  8. Disposition: home  9. ELOS: 7-10 days  10. Follow up Appointments on Discharge: PCP, psychiatry, neurology?, PM&R        Alivia Epps MD  Physical Medicine & Rehabilitation     Time Spent on this Encounter   I spent a total of 35 minutes face to face and coordinating care of Otoniel Gomez.  Over 50% of my time on the unit was spent counseling the patient and /or coordinating care; see note for details.

## 2021-06-10 NOTE — PLAN OF CARE
Discharge Planner Post-Acute Rehab OT:     Discharge Plan: Home with spouse, OP OT.     Precautions: Falls, cognitive impairment.    Current Status:  ADLs: SBA transfers/mobility with fww, CGA without device. CGA ADLs.  IADLs: To be assessed.   Vision/Cognition: Corrective saccades with horizontal tracking. Scoring 18/30 on SLUMs, functionally demonstrating deficits with recall, problem solving, and insight.    Assessment: Initial evaluation completed and POC established. Performance with ADLs significantly below baseline, impacted by impaired balance, weakness, and cognitive/communication deficits. Goals to advance to IND ADLs, anticipate pt will require increased assistance with IADLs due to cognitive deficits. ELOS 7 days with OP OT follow up.    Other Barriers to Discharge (DME, Family Training, etc):   DME/AE: shower chair, bathroom grab bars, possibly fww.   Caregiver training: recommended with spouse prior to discharge.

## 2021-06-11 ENCOUNTER — APPOINTMENT (OUTPATIENT)
Dept: SPEECH THERAPY | Facility: CLINIC | Age: 56
End: 2021-06-11
Payer: COMMERCIAL

## 2021-06-11 ENCOUNTER — APPOINTMENT (OUTPATIENT)
Dept: OCCUPATIONAL THERAPY | Facility: CLINIC | Age: 56
End: 2021-06-11
Payer: COMMERCIAL

## 2021-06-11 ENCOUNTER — APPOINTMENT (OUTPATIENT)
Dept: PHYSICAL THERAPY | Facility: CLINIC | Age: 56
End: 2021-06-11
Payer: COMMERCIAL

## 2021-06-11 LAB
ANION GAP SERPL CALCULATED.3IONS-SCNC: 8 MMOL/L (ref 3–14)
BUN SERPL-MCNC: 32 MG/DL (ref 7–30)
CALCIUM SERPL-MCNC: 9.1 MG/DL (ref 8.5–10.1)
CHLORIDE SERPL-SCNC: 102 MMOL/L (ref 94–109)
CO2 SERPL-SCNC: 25 MMOL/L (ref 20–32)
CREAT SERPL-MCNC: 1.4 MG/DL (ref 0.66–1.25)
ERYTHROCYTE [DISTWIDTH] IN BLOOD BY AUTOMATED COUNT: 14.6 % (ref 10–15)
GFR SERPL CREATININE-BSD FRML MDRD: 56 ML/MIN/{1.73_M2}
GLUCOSE SERPL-MCNC: 86 MG/DL (ref 70–99)
HCT VFR BLD AUTO: 31.5 % (ref 40–53)
HGB BLD-MCNC: 9.8 G/DL (ref 13.3–17.7)
MCH RBC QN AUTO: 28.1 PG (ref 26.5–33)
MCHC RBC AUTO-ENTMCNC: 31.1 G/DL (ref 31.5–36.5)
MCV RBC AUTO: 90 FL (ref 78–100)
PLATELET # BLD AUTO: 273 10E9/L (ref 150–450)
POTASSIUM SERPL-SCNC: 4.8 MMOL/L (ref 3.4–5.3)
RBC # BLD AUTO: 3.49 10E12/L (ref 4.4–5.9)
SODIUM SERPL-SCNC: 135 MMOL/L (ref 133–144)
WBC # BLD AUTO: 6.2 10E9/L (ref 4–11)

## 2021-06-11 PROCEDURE — 128N000003 HC R&B REHAB

## 2021-06-11 PROCEDURE — 80048 BASIC METABOLIC PNL TOTAL CA: CPT | Performed by: PHYSICIAN ASSISTANT

## 2021-06-11 PROCEDURE — 97130 THER IVNTJ EA ADDL 15 MIN: CPT | Mod: GN | Performed by: SPEECH-LANGUAGE PATHOLOGIST

## 2021-06-11 PROCEDURE — 85027 COMPLETE CBC AUTOMATED: CPT | Performed by: PHYSICIAN ASSISTANT

## 2021-06-11 PROCEDURE — 250N000013 HC RX MED GY IP 250 OP 250 PS 637: Performed by: PHYSICIAN ASSISTANT

## 2021-06-11 PROCEDURE — 96125 COGNITIVE TEST BY HC PRO: CPT | Mod: GO | Performed by: OCCUPATIONAL THERAPIST

## 2021-06-11 PROCEDURE — 36415 COLL VENOUS BLD VENIPUNCTURE: CPT | Performed by: PHYSICIAN ASSISTANT

## 2021-06-11 PROCEDURE — 97129 THER IVNTJ 1ST 15 MIN: CPT | Mod: GN | Performed by: SPEECH-LANGUAGE PATHOLOGIST

## 2021-06-11 PROCEDURE — 97112 NEUROMUSCULAR REEDUCATION: CPT | Mod: GP | Performed by: PHYSICAL THERAPIST

## 2021-06-11 PROCEDURE — 250N000013 HC RX MED GY IP 250 OP 250 PS 637: Performed by: PHYSICAL MEDICINE & REHABILITATION

## 2021-06-11 PROCEDURE — 99232 SBSQ HOSP IP/OBS MODERATE 35: CPT | Performed by: PHYSICAL MEDICINE & REHABILITATION

## 2021-06-11 RX ADMIN — LIDOCAINE 1 PATCH: 560 PATCH PERCUTANEOUS; TOPICAL; TRANSDERMAL at 21:07

## 2021-06-11 RX ADMIN — ASPIRIN 81 MG CHEWABLE TABLET 81 MG: 81 TABLET CHEWABLE at 07:59

## 2021-06-11 RX ADMIN — Medication 5 MG: at 21:07

## 2021-06-11 RX ADMIN — FAMOTIDINE 10 MG: 10 TABLET ORAL at 07:59

## 2021-06-11 RX ADMIN — ACETAMINOPHEN 650 MG: 325 TABLET, FILM COATED ORAL at 06:45

## 2021-06-11 RX ADMIN — SIMVASTATIN 20 MG: 20 TABLET, FILM COATED ORAL at 21:07

## 2021-06-11 RX ADMIN — DIVALPROEX SODIUM 250 MG: 250 TABLET, DELAYED RELEASE ORAL at 21:07

## 2021-06-11 RX ADMIN — FAMOTIDINE 10 MG: 10 TABLET ORAL at 21:07

## 2021-06-11 NOTE — PLAN OF CARE
Discharge Planner Post-Acute Rehab SLP:      Discharge Plan: Home with Outpatient?     Precautions: Fall     Current Status:  Communication: Mildly impaired word-finding at conversation level. Auditory comprehension, reading comprehension, verbal expression appear WFL.  Cognition: Suspect mild-moderate cognitive impairment. Complete standardized cognitive evaluation  Swallow: Regular and thin diet. Do not anticipate further needs.     Assessment: SLP: further assessed cognitive skills, informally, slow with alternating and flexible attention, mild difficulty/errors. Recalled 1/3 words, 3/8 facts paragraph recent memory.. -10 minutes 2/2 toileting/nursing cares.     Other Barriers to Discharge (Family Training, etc): TBD

## 2021-06-11 NOTE — PLAN OF CARE
FOCUS/GOAL  Pain management, Medical management, Mobility, and Safety management    ASSESSMENT, INTERVENTIONS AND CONTINUING PLAN FOR GOAL:  Patient is alert oriented with forgetfulness, denies pain, dizziness, cough, short of breathing, or headache. VSS WD. Wife and son visited during supper time.Some impulsiveness noted in this shift. Patient was educated about safety and fall risk reminded to use the call light and and wait for the staff assist. Ambulated to the bathroom with CGA uses walker voided x 2 but no BM. Last BM 06/10. Call light in reach alarm is on. Safety check was done. We will continue to monitor and will continue with the POC.

## 2021-06-11 NOTE — PROGRESS NOTES
Assessment:     Diagnoses and all orders for this visit:    Spondylosis of lumbar region without myelopathy or radiculopathy  -     MR Lumbar Spine Without Contrast; Future; Expected date: 09/25/2020  -     MR Lumbar Spine Without Contrast; Standing  -     MR Lumbar Spine Without Contrast    History of brain tumor  -     MR Lumbar Spine Without Contrast; Future; Expected date: 09/25/2020  -     MR Lumbar Spine Without Contrast; Standing  -     MR Lumbar Spine Without Contrast    History of chemotherapy  -     MR Lumbar Spine Without Contrast; Future; Expected date: 09/25/2020  -     MR Lumbar Spine Without Contrast; Standing  -     MR Lumbar Spine Without Contrast    History of radiation to head and neck region  -     MR Lumbar Spine Without Contrast; Future; Expected date: 09/25/2020  -     MR Lumbar Spine Without Contrast; Standing  -     MR Lumbar Spine Without Contrast       Otoniel Gomez is a 54 y.o. y.o. male with past medical history significant for lumbar spondylosis without myelopathy who presents today for follow-up regarding low back pain:    -Patient continues to have low back pain.  He did not have great relief with the facet joint injections.  He may have had short-term relief, however it is hard for him to remember as this was done in February 2019.  With his history of mental blastoma I like to order an MRI of his lumbar spine to further evaluate.  I have ordered this at [facility] in [place] per patient's preference.     Plan:     A shared decision making plan was used. The patient's values and choices were respected. Prior medical records from our last visit on 2/25/2018 were reviewed today. The following represents what was discussed and decided upon by the provider and the patient.        -DIAGNOSTIC TESTS: Images were personally reviewed and interpreted.   --Flexion-extension x-rays of the lumbar spine dated 2/23/2017 report is reviewed and does show mild to moderate degenerative  interspace narrowing posteriorly at L3-4 and L4-5.  There is mild interspace narrowing at L5-S1 and minor ventral spurring at L5-S1.  --Bone density scan dated 2/23/2017 report is reviewed and shows normal bone density.  --Scoliosis x-ray dated 3/20/2017 report is reviewed.  There is no significant scoliotic curve.  Normal thoracic kyphosis and lumbar lordosis.  -MRI of the lumbar spine in 2018 does show facet joint arthritis.-These were personally viewed images interpreted and discussed with the patient.  These images were done at [facility].  There is no signs of metastasis.    -INTERVENTIONS: No interventions at this time.    -MEDICATIONS: No changes to medications.  -  Discussed side effects of medications and proper use. Patient verbalized understanding.    -PHYSICAL THERAPY: He continues to do his physical therapy exercises at home on a daily basis.  Discussed the importance of core strengthening, ROM, stretching exercises with the patient and how each of these entities is important in decreasing pain.  Explained to the patient that the purpose of physical therapy is to teach the patient a home exercise program.  These exercises need to be performed every day in order to decrease pain and prevent future occurrences of pain.        -PATIENT EDUCATION: We discussed pain management in a multimodal fashion including physical therapy, medication management, possible future injections.    -FOLLOW UP: Patient will follow-up via video visit after I have reviewed images.  Advised to contact clinic if symptoms worsen or change.    Subjective:     Otoniel Gomez is a 54 y.o. male who presents today for follow-up regarding low back pain.  Patient reports that he continues to have low back pain that is worse with standing and sitting for prolonged periods of time as well as bending over.  Caring and lifting heavy things is also difficult.  Physical therapy exercises at the has been shown for his back have been  helpful.  He did have facet joint injections back in February 2019 and did not have any long-lasting relief and cannot remember if he had short lasting relief.  His pain today is 7-8/10 is worse to 6/10 as best as 3/10.  Ibuprofen is somewhat helpful.  Pain is across his belt line.  He does have a history of medulloblastoma and follows with [facility].  He notes that he should be having an appointment with them soon.  Back in 2018 they had done repeat imaging with no further signs of metastasis.  He denies any bowel or bladder changes, fevers, chills, unintentional weight loss.  He does note headache.  We did discuss that he should follow with his oncologist again.    -Treatment to Date: Lumbar facet joint injections in 2015.  MRI of the lumbar spine dated 1/21/2017.  Several sessions of physical therapy.  Bilateral L4-5 and L5-S1 facet joint injections done on 2/27/2019.    Patient Active Problem List   Diagnosis     Spondylosis of lumbar region without myelopathy or radiculopathy       Current Outpatient Medications on File Prior to Encounter   Medication Sig Dispense Refill     aspirin 81 MG EC tablet Take 81 mg by mouth daily.       fluticasone propionate (FLONASE) 50 mcg/actuation nasal spray 2 sprays.       ibuprofen 200 mg cap Take 600 mg by mouth every 6 (six) hours.              simvastatin (ZOCOR) 20 MG tablet        No current facility-administered medications on file prior to encounter.        Allergies   Allergen Reactions     Fentanyl Other (See Comments)     Amoxicillin Other (See Comments)     upset stomach  upset stomach       Dust [Other Environmental Allergy]      Grass Pollen-June Grass Standard        Past Medical History:   Diagnosis Date     Cancer (H)     brain tumor      Low back pain      Seizures (H)         Review of Systems  ROS:  Specifically negative for bowel/bladder dysfunction, balance changes, dizziness, foot drop, fevers, chills, appetite changes, nausea/vomiting,  unexplained weight loss. Otherwise 13 systems reviewed are negative. Please see the patient's intake questionnaire from today for details.    Reviewed Social, Family, Past Medical and Past Surgical history with patient, no significant changes noted since prior visit.     Objective:     /62 (Patient Site: Right Arm, Patient Position: Sitting, Cuff Size: Adult Large)   Pulse 66   Temp 97.7  F (36.5  C) (Oral)   Wt 158 lb 1.6 oz (71.7 kg)   SpO2 99%   BMI 25.14 kg/m      PHYSICAL EXAMINATION:    --CONSTITUTIONAL: Well developed, well nourished, healthy appearing individual.  --PSYCHIATRIC: Appropriate mood and affect. No difficulty interacting due to temper, social withdrawal, or memory issues.  --SKIN: Lumbar region is dry and intact.   --RESPIRATORY: Normal rhythm and effort. No abnormal accessory muscle breathing patterns noted.   --MUSCULOSKELETAL:  Normal lumbar lordosis noted, no lateral shift.  --GROSS MOTOR: Easily arises from a seated position. Gait is non-antalgic  --LUMBAR SPINE:  Inspection reveals no evidence of deformity. Range of motion is mildly limited in lumbar flexion, extension, lateral rotation.  Tenderness palpation over the low lumbar paraspinal muscles at the belt line. Straight leg raising in the supine position is negative to radicular pain.   --SACROILIAC JOINT: Negative distraction.  Negative Estelle's with reproduction of pain to affected extremity. One Finger point test negative.  --LOWER EXTREMITY MOTOR TESTING:  Plantar flexion left 5/5, right 5/5   Dorsiflexion left 5/5, right 5/5   Great toe MTP extension left 5/5, right 5/5  Knee flexion left 5/5, right 5/5  Knee extension left 5/5, right 5/5   Hip flexion left 5/5, right 5/5  Hip abduction left 5/5, right 5/5  Hip adduction left 5/5, right 5/5   --HIPS: Full range of motion bilaterally.  Stinchfield test is negative bilaterally.  --NEUROLOGIC: Bilateral patellar and achilles reflexes are physiologic and symmetric. Sensation  to light touch is intact in the bilateral L4, L5, and S1 dermatomes.    RESULTS:   Imaging: Lumbar spine imaging was reviewed today. The images were shown to the patient and the findings were explained using a spine model.

## 2021-06-11 NOTE — PROGRESS NOTES
Assessment:     Diagnoses and all orders for this visit:    Spondylosis of lumbar region without myelopathy or radiculopathy  -     OPS Medial Branch Block Bilateral; Future; Expected date: 10/07/2020    History of brain tumor    History of chemotherapy    History of radiation to head and neck region    Lumbar disc herniation       Otoniel Gomez is a 54 y.o. y.o. male with past medical history significant for  lumbar spondylosis without myelopathy  who presents today for follow-up regarding low back pain:    -Patient continues to have low back pain is likely secondary to lumbar spondylosis without myelopathy.  There are no concerning lesions an MRI of the lumbar spine.     Plan:     A shared decision making plan was used. The patient's values and choices were respected. Prior medical records from our last visit on 9/18/2020 were reviewed today. The following represents what was discussed and decided upon by the provider and the patient.        -DIAGNOSTIC TESTS: Images were personally reviewed and interpreted.   --Flexion-extension x-rays of the lumbar spine dated 2/23/2017 report is reviewed and does show mild to moderate degenerative interspace narrowing posteriorly at L3-4 and L4-5.  There is mild interspace narrowing at L5-S1 and minor ventral spurring at L5-S1.  --Bone density scan dated 2/23/2017 report is reviewed and shows normal bone density.  --Scoliosis x-ray dated 3/20/2017 report is reviewed.  There is no significant scoliotic curve.  Normal thoracic kyphosis and lumbar lordosis.  -MRI of the lumbar spine in 2018 does show facet joint arthritis.-These were personally viewed images interpreted and discussed with the patient.  These images were done at [facility].  There is no signs of metastasis.  --MRI of the lumbar spine dated 9/29/2020 is personally viewed images interpreted and discussed with the patient.  It does show multilevel degenerative disc disease which is most pronounced at L3-4.   L3-4 there is also moderate spinal canal stenosis and bilateral lateral recess stenosis with possible abutment of the bilateral L4 nerve roots.  At L4-5 there is facet arthropathy and moderate spinal canal stenosis.  There is no other significant spinal canal narrowing at any other level within the lumbar spine.  At L5-S1 there is severe facet arthropathy.    -INTERVENTIONS: I have ordered bilateral L3, L4, L5 diagnostic medial branch blocks with an eye to radiofrequency ablation.  He did receive 1 and 1/2 months of good relief with his lumbar facet injections in February 2019, however relief was not longer lasting than this.    -MEDICATIONS: No changes to medications.  -  Discussed side effects of medications and proper use. Patient verbalized understanding.    -PHYSICAL THERAPY: He is currently doing physical therapy exercises at home on a daily basis.       -PATIENT EDUCATION: We discussed pain management in a multimodal fashion including physical therapy, medication management, possible future injections.    -FOLLOW UP: Patient will follow-up after injections.  Advised to contact clinic if symptoms worsen or change.    Subjective:     Otoniel Gomez is a 54 y.o. male who presents today for follow-up regarding low back pain.  Patient reports that he continues to have low back pain that is unchanged.  Pain is worse with bending forward as well as sitting for long time or standing for a long time.  His pain today is 7/10 is worse is 9/10 is worse is 9/10.  Pain is improved with physical  exercises that he continues to do on a daily basis.  He does call his wife on the phone who is very helpful with the planning process.  He does take ibuprofen occasionally for pain and this is helpful.  He denies any bowel or bladder changes, fevers, chills, unintentional weight loss.  Pain is achy in nature.  There is no radiation of pain down his legs.    -Treatment to Date: Lumbar facet joint injections in 2015.  MRI of the  lumbar spine dated 1/21/2017.  Several sessions of physical therapy.  Bilateral L4-5 and L5-S1 facet joint injections done on 2/27/2019.  MRI lumbar spine dated 9/29/2013.    Patient Active Problem List   Diagnosis     Spondylosis of lumbar region without myelopathy or radiculopathy       Current Outpatient Medications on File Prior to Encounter   Medication Sig Dispense Refill     aspirin 81 MG EC tablet Take 81 mg by mouth daily.       fluticasone propionate (FLONASE) 50 mcg/actuation nasal spray 2 sprays.       ibuprofen 200 mg cap Take 600 mg by mouth every 6 (six) hours.              simvastatin (ZOCOR) 20 MG tablet        No current facility-administered medications on file prior to encounter.        Allergies   Allergen Reactions     Fentanyl Other (See Comments)     Amoxicillin Other (See Comments)     upset stomach  upset stomach       Dust [Other Environmental Allergy]      Grass Pollen-June Grass Standard        Past Medical History:   Diagnosis Date     Cancer (H)     brain tumor      Low back pain      Seizures (H)         Review of Systems  ROS:  Specifically negative for bowel/bladder dysfunction, balance changes, headache, dizziness, foot drop, fevers, chills, appetite changes, nausea/vomiting, unexplained weight loss. Otherwise 13 systems reviewed are negative. Please see the patient's intake questionnaire from today for details.    Reviewed Social, Family, Past Medical and Past Surgical history with patient, no significant changes noted since prior visit.     Objective:     /68 (Patient Site: Right Arm, Patient Position: Sitting, Cuff Size: Adult Large)   Pulse 62   Temp 98  F (36.7  C) (Oral)   SpO2 99%     PHYSICAL EXAMINATION:    --CONSTITUTIONAL: Well developed, well nourished, healthy appearing individual.  --PSYCHIATRIC: Appropriate mood and affect. No difficulty interacting due to temper, social withdrawal, or memory issues.  --SKIN: Lumbar region is dry and intact.   --RESPIRATORY:  Normal rhythm and effort. No abnormal accessory muscle breathing patterns noted.   --MUSCULOSKELETAL:  Normal lumbar lordosis noted, no lateral shift.  --GROSS MOTOR: Easily arises from a seated position. Gait is non-antalgic  --LUMBAR SPINE:  Inspection reveals no evidence of deformity. Range of motion is mildly limited in lumbar flexion, extension, lateral rotation.  Tenderness palpation along the low lumbar paraspinal muscles bilaterally. Straight leg raising in the supine position is negative to radicular pain. Sciatic notch non-tender.   --SACROILIAC JOINT:  One Finger point test negative.  --LOWER EXTREMITY MOTOR TESTING:  Plantar flexion left 5/5, right 5/5   Dorsiflexion left 5/5, right 5/5   Great toe MTP extension left 5/5, right 5/5  Knee flexion left 5/5, right 5/5  Knee extension left 5/5, right 5/5   Hip flexion left 5/5, right 5/5  Hip abduction left 5/5, right 5/5  Hip adduction left 5/5, right 5/5   --HIPS: Full range of motion bilaterally.    --NEUROLOGIC:  Sensation to light touch is intact in the bilateral L4, L5, and S1 dermatomes.    RESULTS:   Imaging: Lumbar spine imaging was reviewed today. The images were shown to the patient and the findings were explained using a spine model.    Mr Lumbar Spine Without Contrast    Result Date: 9/29/2020  EXAM DATE:         09/28/2020 EXAM: MRI LUMBAR SPINE W/O CONTRAST LOCATION: [facility] DATE/TIME: 9/28/2020 10:45 AM INDICATION: low back pain with a history of  medulloblastoma. Suboccipital craniectomy 2007 - Medulloblastoma COMPARISON: 12/8/2018 MR TECHNIQUE: Without IV contrast FINDINGS: Nomenclature assumes 5 lumbar-type vertebral bodies. Mild degenerative endplate changes at L3/L4 and L4/L5. Minimal anterior marginal osteophytes at L1/L2 and L3/L4. The vertebral bodies of the lumbar spine otherwise have normal stature, alignment, and marrow signal intensity. The conus terminates at the T12/L1 level and has normal morphology and  appearance. T12/L1:  Normal disc signal and disc height. No posterior disc bulge or spinal canal narrowing. No neural foraminal narrowing. L1/L2:  Normal disc signal and disc height. No posterior disc bulge or spinal canal narrowing. No neural foraminal narrowing. L2/L3:  Normal disc signal and disc height. No posterior disc bulge or spinal canal narrowing. No neural foraminal narrowing. L3/L4:  Moderate to advanced loss of disc signal and disc height. Symmetric disc bulge, posterior annular fissure with a superimposed focal central disc protrusion with moderate spinal canal narrowing. Bilateral lateral recess narrowing with abutment of the descending L4 nerve roots bilaterally. Mild bilateral neural foraminal narrowing. L4/L5:  Mild loss of disc signal and disc height. Symmetric disc bulge, facet arthropathy and ligamentum flavum thickening with mild to moderate spinal canal narrowing. Moderate right neural foraminal narrowing. No left neural foraminal narrowing. L5/S1: Mild loss of disc signal and disc height. Symmetric disc bulge and advanced facet arthropathy without spinal canal narrowing. No neural foraminal narrowing. IMPRESSION: 1.  Multilevel degenerative disc disease of the lumbar spine with symmetric disc bulges, most pronounced at the L3/L4 level where there is a symmetric disc bulge, posterior annular fissure with a superimposed focal central disc protrusion with moderate spinal canal and bilateral lateral recess narrowing. There is abutment the descending L4 nerve roots bilaterally. 2.  At the L4/L5 level, there is a symmetric disc bulge, facet arthropathy and ligamentum flavum thickening with mild to moderate spinal canal narrowing. 3.  No significant spinal canal narrowing at any other level within the lumbar spine. 4.  Multilevel posterolateral disc disease and facet arthropathy with multilevel neural foraminal narrowing, most pronounced at the L4/L5 level where there is moderate right neural  foraminal narrowing.

## 2021-06-11 NOTE — PLAN OF CARE
FOCUS/GOAL  Medical management    ASSESSMENT, INTERVENTIONS AND CONTINUING PLAN FOR GOAL:  VSS. Labs this morning. Result seen by provider, no new order. No safety incident. Had lunch with family. 300 ml fluid intake this shift. Voided. No BM this shift. Weill continue with POC.

## 2021-06-11 NOTE — PATIENT INSTRUCTIONS - HE
I ordered an MRI of your lumbar spine.  Once I reviewed this we will set up a video visit that we can review the images with you.    ~Please call Nurse Navigation line [phone] with any questions or concerns about your treatment plan, if symptoms worsen and you would like to be seen urgently, or if you have problems controlling bladder and bowel function.

## 2021-06-11 NOTE — PROGRESS NOTES
"  [facility]   Acute Rehabilitation Unit  Daily progress note    INTERVAL HISTORY  Otoniel Gomez was seen and examined this morning. He was doing well. Left calf pain has improved. Neck pain is unchanged. Reviewed lab results. He remains on FR.     He is I with bed mobility and requires CGA for transfers. High fall risk due to imbalance, limited insight, impulsivity and cognitive deficits. His room was moved to closer room to the nursing station.       MEDICATIONS    aspirin  81 mg Oral Daily     divalproex sodium delayed-release  250 mg Oral At Bedtime     famotidine  10 mg Oral BID     lidocaine  1 patch Transdermal Q24h    And     lidocaine   Transdermal Q8H     melatonin  5 mg Oral At Bedtime     simvastatin  20 mg Oral At Bedtime        acetaminophen, menthol (Topical Analgesic) 2.5%, polyethylene glycol, senna-docusate     PHYSICAL EXAM  /58 (BP Location: Left arm)   Pulse 81   Temp 96.8  F (36  C) (Oral)   Resp 17   Ht 1.676 m (5' 6\")   Wt 70.1 kg (154 lb 9.6 oz)   SpO2 92%   BMI 24.95 kg/m      Gen: NAD, resting in bed   Pulm: non-labored on room air  Ext: no clear edema in bilateral lower extremities    Neuro/MSK: was seen later ambulating with no assistive device and CGA       LABS  Hgb stable / in better range  Cr stable  Na 135        ASSESSMENT AND PLAN  Otoniel Gomez is a 55 year old male with past medical history of 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  He is admitted to ARU on 6/8/21 for rehabilitation and ongoing medical management.       Rehabilitation - continue " comprehensive acute inpatient rehabilitation program with multidisciplinary approach including therapies, rehab nursing, and physiatry following. See interval history for updates.     Medical issues   Seizures  Received valium per EMS, ongoing seizures.  Loaded with Keppra, benzodiazepine in ED. Neurology consulted.  Likely precipitated by severe hyponatremia.  Required intubation for airway protection.  EEG consistent with severe encephalopathy consistent with postictal state, sedation, hyponatremia, but no seizure activity observed.  Per neuro, likely will have prolonged recovery time given history of brain tumor and numerous chronic microhemorrhages, large amount of sedation that was initially used, and recovering hyponatremia.  Seizures resolved with stabilization of sodium levels.  Neurology did not feel AEDs indicated given underlying cause (hyponatremia) treated.  - Seizure precautions     Incidental CVA  Noted on brain MRI 5/22 with 2 small infarcts in R temporal lobe.  Neurology following patient and felt these areas were incidental findings, unrelated to his current presentation.  Repeat brain MRI 5/27 with interval increase in size of 2 areas of acute infarct in R temporal lobe.  - Continue PTA ASA 81 mg  - Continue PT/OT/SLP    Acute delirium/encephalopathy   Noted beginning evening of 5/29.  Associated with confusion, agitation, visual hallucinations, combativeness, impulsive, pulling at lines, devices and threatening to hit at staff.  Felt to be likely in the setting of underlying brain pathology from medulloblastoma, significant hyponatremia early hospital course, coexisting temporal stroke.  Transferred to ICU and received Precedex infusion.  Psychiatry consulted, started on scheduled Seroquel and IV valproic acid.  Seroquel has since been weaned down to as needed and delayed release oral Depakote started at bedtime, currently at 250 mg.  No ongoing significant agitation since 6/2.  Elected to defer  repeat brain MRI due to contrast (JACK) and mental status now returned to baseline.  - Monitor for recurrence  - Continue Depakote 250 mg delayed-release tablet at bedtime.  Wean as able.  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  Cr jumped from 0.88 to peak of 1.55, down-trending since.  Also suspect possible contribution of reduced oral fluid intake, NPO due to dysphagia on 5/31.  Kidney function improved with IV hydration, though Cr still remains elevated at 1.40 on 6/7.  - Cr trending down / stable. If Na remains stable might consider another bolus of IVF; not sure if this is his new baseline   - Trend BMP  - Avoid further IV dye for now    Hyponatremia   Severe. Initial sodium 109 with unintentional rapid correction to 123, D5W given with reduction to 120.  Managed by nephrology.  [place] likely related to polydipsia since urine osmolality was not elevated and ADH physiology.  Patient was also on sertraline as outpatient, which was discontinued due to possible contribution.  Sodium with ongoing improvement, stable at 138.  - Na has been overall stable  - Trend BMP  - Continue fluid restriction of 1.8 L; dietitian will provide education as he will go home on FR   - Per nephrology, avoid proton pump inhibitors, ACE inhibitors, thiazide diuretics, and SSRI medications for now.    Possible aspiration pneumonia  Acute respiratory failure with hypoxia, resolved  Required intubation for airway protection.  Extubated on 5/23 with no further airway concerns or hypoxia.  Productive cough initially.  Mild interstitial prominence of right lung base on x-ray.  Sputum culture with MRSA.  "Sensitivity noted.  Patient treated with IV vancomycin, with stop date of 5/31.  - Monitor respiratory status  - Encourage IS  - Supplemental oxygen PRN    Gastroesophageal reflux  1 episode of emesis night of 6/4 but seemed related to specific food.  Elected to defer GI consult with no clear indication for EGD.  Started on famotidine rather than PPI due to recent significant hyponatremia.  - Continue famotidine 20 mg daily.    Elevated BP  Patient normotensive at discharge to ARU.   - Remains stable, normotensive  - Monitor BP    Nutrition  Dysphagia, resolved  Refused nasogastric tube 5/28.  SLP assessed 5/29 and recommended soft diet.  Discussed with speech therapy 6/2. Dysphagia correlates with declining mental status from encephalopathy.  Speech therapy recommended video swallow for 6/3 with speech to rule out silent aspiration. Results shows multiple episodes of penetration with thin liquid barium. No evidence of aspiration.  Diet upgraded to regular diet, thin liquids by speech therapy on 6/4.  - Continue SLP  - Continue regular diet with thin liquids     Hyperlipidemia  PTA simvastatin held in setting of elevated CK (4034 at admission, felt like secondary to seizures).  - Per discharge summary, check CK in 2 days (6/10), resume simvastatin if back to normal. 06/10/21 CK wnl; resumed simvastatin.      Hx PTSD  Follows with psychology, psychiatry as outpatient at [facility].  Started on sertraline 25 mg daily on 4/9/21.  This admission, PTA sertraline discontinued in setting of severe hyponatremia.  - Continue to hold sertraline  - Monitor for symptoms  - Follow up with outpatient psychiatry to consider alternative to sertraline for PTSD    Posterior neck pain  Present throughout acute hospitalization, per patient some chronic neck pain which he attributes to prior injury \"tubing\".  Denies radiation. Palpable tightness in right trapezius and levator muscles, palpation reproduces patient's pain.  Most " likely myofascial pain with some facet mediated pain. No clinical s/s of cervical radiculopathy or myelopathy at this point. Alleviated with lidocaine patch.  - Continue lidocaine patch at night  - Tylenol PRN  - Aqua-k pad   - Discussed consideration for follow-up with PM&R as outpatient for injections if ongoing    Left calf pain  Reports new onset 6/9.  Denies injury of previous history of pain in this area.  No warmth, swelling.  Aggravated by weight-bearing.  Tender to palpation in mid/posterior calf, aggravated by dorsiflexion.  Suspect muscular, possible Achilles tendonitis.  - Recommend stretching, trial ice or heat  - Trial [place]  - Monitor      1. Adjustment to disability:  Clinical psychology to eval and treat as indicated  2. FEN: regular diet, thin liquids  3. Bowel: continent, monitor, PRN bowel meds available  4. Bladder: continent, PVRs at admission 21,0,17.  Ongoing monitoring PRN  5. DVT Prophylaxis: mechanical, ambulation  6. GI Prophylaxis: famotidine  7. Code: full  8. Disposition: home  9. ELOS: 7-10 days  10. Follow up Appointments on Discharge: PCP, psychiatry, neurology?, PM&R        Alivia Epps MD  Physical Medicine & Rehabilitation     Time Spent on this Encounter   I spent a total of 25 minutes face to face and coordinating care of Otoniel Gomez.  Over 50% of my time on the unit was spent counseling the patient and /or coordinating care; see note for details.

## 2021-06-11 NOTE — PLAN OF CARE
FOCUS/GOAL  Medical management    ASSESSMENT, INTERVENTIONS AND CONTINUING PLAN FOR GOAL:    Pt is alert and oriented. Slow paced speech. Denies sob, dizziness or any new weakness. Ind w/ bed mobility. Complained of neck pain early this morning and was given PRN Tylenol. To follow-up results. Continent of bowel and bladder. Ambulates to the bathroom for toileting. Lidocaine patch removed at 0726. Has been using the call light for needs. Bed alarm on, frequent checks done for safety. Sleeping during rounds. Will continue POC.

## 2021-06-11 NOTE — PLAN OF CARE
Discharge Planner Post-Acute Rehab OT:     Discharge Plan: Home with spouse, OP OT.     Precautions: Falls, cognitive impairment.    Current Status:  ADLs: SBA transfers/mobility with fww, CGA without device. SBA-CGA ADLs.  IADLs: EFPT-see POC note 6/11.   Vision/Cognition: Corrective saccades with horizontal tracking. Scoring 18/30 on SLUMs, functionally demonstrating deficits with recall, problem solving, and insight.    Assessment: Administered EFPT. Noting deficits with recall (especially with larger amounts of information), attention, and higher level problem solving. See EFPT POC note for recommendations. Pt with improved insight into deficits, receptive of information and recommendations. Pt will benefit from ongoing skilled OT intervention to progress IND ADLs/IADLs.    Other Barriers to Discharge (DME, Family Training, etc):   DME/AE: shower chair, bathroom grab bars, possibly fww.   Caregiver training: recommended with spouse prior to discharge.

## 2021-06-11 NOTE — PLAN OF CARE
Executive Function Performance Test (EFPT)     SUMMARY OF TEST:  The EFPT assesses patient performance for 4 functional tasks that are essential for self-maintenance and independent living. The tasks include meal prep, use of the telephone, medication administration, and bill pay. Each task assesses the following components of executive function: initiation, organization, sequencing, judgement/ safety, and termination.     Scoring:   Scoring is based on a 5 point scale:   0 = Independent  1 = Verbal Guidance  2 = Gestural Guidance  3 = Verbal Direct Instruction  4 = Physical Assistance  5 = Do for participant  A lower score indicates greater independence; a higher score indicates requirement for greater assistance.    DATE OF TESTIN2021  Tasks   Task Total Task Score Time (min)   Cooking  10   Telephone  10   Medication 3/25 5   Bills 6/25 10   TOTAL  35   Details of Cognitive Performance:   Cognitive Construct Cook Telephone Meds Bills Total Score   Initiation 0: Independent 1: Verbal Guidance 0: Independent 1: Verbal Guidance    Organization 3: Verbal Direct Instruction 5: Do for Participant  2: Gestural Guidance 1: Verbal Guidance    Sequencing 3: Verbal Direct Instruction 5: Do for Participant  1: Verbal Guidance 3: Verbal Direct Instruction    Judgement & Safety 1: Verbal Guidance 4: Physical Assistance 0: Independent 0: Independent    Completion 0: Independent 3: Verbal Direct Instruction 0: Independent 1: Verbal Guidance      ?  INTERPRETATION OF TEST RESULTS:    Based on results of EFPT, patient currently requires assistance for the following:   Meal preparation  Use of telephone   Making appointments, tracking schedule  Medication  Finances    Summary of functional cognitive status:   Deficits with organization  Deficits with sequencing  Milder deficits with judgement and safety    Examples of cognitive deficits identified during testing:   Meal preparation:  Decision to not utilize a walker and requiring intermittent balance assist. Delayed efficiency when organizing supplies and environment. Completing steps of meal preparation in wrong order but this didn't significantly impact the outcome.   Telephone: Requiring physical assistance to locate correct number. Needing physical assistance to dial number, difficulties tracking from notepad to dial pad. Asking correct question but with delayed processing.  Bill Pay: Increased time to organize materials and determine correct bill to pay. Needing verbal guidance to correctly complete payment and mailing note.     Factors affecting performance:  Impaired hearing  Balance impairment  Impulsive actions  New or complex medical issue    Recommendations:   Meal preparation: Continue to assess IND with familiar, simple meal prep. Modify to complete microwave or cold meal prep. Supervision with anything stove top.  Use of telephone: Recommend assistance with calling/setting up appointments and using a weekly calendar.  Medication: Recommend Medication Assessment Program.  Finances: Supervision with simple finances, assist with complex finances.    TIME ADMINISTERING TEST: 45 min  TIME FOR INTERPRETATION AND PREPARATION OF REPORT: 15 min  TOTAL TIME: 60 min

## 2021-06-11 NOTE — PATIENT INSTRUCTIONS - HE
I have ordered test blocks for your low back pain.  He will need a  for these injections.    Please continue with physical therapy exercises at home.    ~Please call Nurse Navigation line [phone] with any questions or concerns about your treatment plan, if symptoms worsen and you would like to be seen urgently, or if you have problems controlling bladder and bowel function.

## 2021-06-11 NOTE — PLAN OF CARE
"Discharge Planner Post-Acute Rehab PT:      Discharge Plan: Home with spouse, 5 COMPA with R rail. OP PT.     Precautions: Falls, Seizure     Current Status:  Bed Mobility: IND  Transfer: CGA without device  Gait: up to 300 ft CGA. Recommend training without device in therapy, use of FWW with nursing for now.  Stairs: 12x6\" steps with R rail, reciprocal, CGA  Balance: MURRAY on 6/8: 38/56     Assessment:  Continuing to focus on static and dynamic standing balance to improve safety with mobility and reduce risk of falls, especially considering impulsivity and tendency to self transfer in room without device. Notes Ktape to L gastroc/soleus to be helpful. No skin itching or irritation noted; reapplied today and patient without complaints of similar pain t/o today's session, although does note to be limited by neck pain with extended time upright- likely postural related.     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

## 2021-06-12 ENCOUNTER — APPOINTMENT (OUTPATIENT)
Dept: SPEECH THERAPY | Facility: CLINIC | Age: 56
End: 2021-06-12
Payer: COMMERCIAL

## 2021-06-12 ENCOUNTER — APPOINTMENT (OUTPATIENT)
Dept: OCCUPATIONAL THERAPY | Facility: CLINIC | Age: 56
End: 2021-06-12
Payer: COMMERCIAL

## 2021-06-12 ENCOUNTER — APPOINTMENT (OUTPATIENT)
Dept: PHYSICAL THERAPY | Facility: CLINIC | Age: 56
End: 2021-06-12
Payer: COMMERCIAL

## 2021-06-12 PROCEDURE — 250N000013 HC RX MED GY IP 250 OP 250 PS 637: Performed by: PHYSICAL MEDICINE & REHABILITATION

## 2021-06-12 PROCEDURE — 97130 THER IVNTJ EA ADDL 15 MIN: CPT | Mod: GN | Performed by: REHABILITATION PRACTITIONER

## 2021-06-12 PROCEDURE — 97530 THERAPEUTIC ACTIVITIES: CPT | Mod: GO

## 2021-06-12 PROCEDURE — 128N000003 HC R&B REHAB

## 2021-06-12 PROCEDURE — 250N000013 HC RX MED GY IP 250 OP 250 PS 637: Performed by: PHYSICIAN ASSISTANT

## 2021-06-12 PROCEDURE — 97112 NEUROMUSCULAR REEDUCATION: CPT | Mod: GP | Performed by: PHYSICAL THERAPIST

## 2021-06-12 PROCEDURE — 97129 THER IVNTJ 1ST 15 MIN: CPT | Mod: GN | Performed by: REHABILITATION PRACTITIONER

## 2021-06-12 RX ADMIN — FAMOTIDINE 10 MG: 10 TABLET ORAL at 08:20

## 2021-06-12 RX ADMIN — SIMVASTATIN 20 MG: 20 TABLET, FILM COATED ORAL at 22:57

## 2021-06-12 RX ADMIN — ASPIRIN 81 MG CHEWABLE TABLET 81 MG: 81 TABLET CHEWABLE at 07:52

## 2021-06-12 RX ADMIN — Medication: at 08:20

## 2021-06-12 RX ADMIN — Medication 5 MG: at 22:56

## 2021-06-12 RX ADMIN — DIVALPROEX SODIUM 250 MG: 250 TABLET, DELAYED RELEASE ORAL at 22:56
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
## 2021-06-12 NOTE — PATIENT INSTRUCTIONS - HE
*Return Pain Diary as soon as possible. We will review & get back to you with future plan of care.      DISCHARGE INSTRUCTIONS    During office hours (8:00 a.m.- 4:00 p.m.) questions or concerns may be answered  by calling [facility] Navigation Nurses at  [phone].  Messages received after hours will be returned the following business day.      In the case of an emergency, please dial 911 or seek assistance at the nearest Emergency Room/Urgent Care facility.     All Patients:  ? You may experience an increase in your symptoms for the first 2 days, once the numbing medication wears off.    ? You may resume your regular medication, no pain medication until you have completed your diary    ? You may shower. No swimming, tub bath or hot tub for 24 hours; remove bandage after 4 hours    ? Continue your activities that can cause you pain to test the blocks.                         ? You should not drive for the next 3 to 5 hours (have someone drive you)           POSSIBLE PROCEDURE SIDE EFFECTS  -Call Spine Center if concerned-    Increased Pain  Increased numbness/tingling     Nausea/Vomiting  Bruising/bleeding at site (hematoma)             Swelling at site (edema) Headache  Difficulty walking  Infection        Fever greater than 100.5

## 2021-06-12 NOTE — PLAN OF CARE
FOCUS/GOAL  Medical management    ASSESSMENT, INTERVENTIONS AND CONTINUING PLAN FOR GOAL:  Pt is alert, use call light for assistance. CGA1gb w/ walker for ambulation to the BR. Continent of bladder x 3. Has pain on his nape, lidocaine patch on during the night. Slept intermittently. Cont w/ POC.

## 2021-06-12 NOTE — TELEPHONE ENCOUNTER
Attempted to contact patient, but he was unreachable at this time.  A message was left with care plan recommendations by Dr. Chopra to proceed with confirmatory bilateral L3, L4, L5 medial branch blocks based on his positive response to the initial blocks on 10/23/20.    The patient was encouraged to contact the Spine Center if he had any questions regarding the recommendations.  Procedure requirements were included in the message in the event that he was interested in moving forward with scheduling the procedure.

## 2021-06-12 NOTE — PLAN OF CARE
Discharge Planner Post-Acute Rehab OT:     Discharge Plan: Home with spouse, OP OT.     Precautions: Falls, cognitive impairment.    Current Status:  ADLs: SBA transfers/mobility with fww, CGA without device. SBA-CGA ADLs.  IADLs: EFPT-see POC note 6/11.   Vision/Cognition: Corrective saccades with horizontal tracking. Scoring 18/30 on SLUMs, functionally demonstrating deficits with recall, problem solving, and insight.    Assessment: Pt tolerated cognitive tasks well. Pt unable to complete correct weekly med box, trialed 3 medication bottles. Pt will benefit from ongoing skilled OT intervention to progress IND ADLs/IADLs.    Other Barriers to Discharge (DME, Family Training, etc):   DME/AE: shower chair, bathroom grab bars, possibly fww.   Caregiver training: recommended with spouse prior to discharge.

## 2021-06-12 NOTE — PLAN OF CARE
Discharge Planner Post-Acute Rehab SLP:      Discharge Plan: Home with spouse. Outpatient SLP.     Precautions: Fall     Current Status:  Communication: Mildly impaired word-finding at conversation level. Auditory comprehension, reading comprehension, verbal expression appear WFL.  Cognition: RBANS administered 6/10/21. Moderate-severe cognitive-linguistic impairment.  Swallow: Regular and thin diet. Do not anticipate further needs.     Assessment: Intervention targeted moderate level planning/problem solving task. Pt required to follow written instructions to plan out a kitchen shelving. Pt demonstrated inconsistent attention to written details and decreased error awareness. Increased time required to process more complex information. Note patient using strategies to stay organized (checking items off when complete). Pt required mod A assist to achieve 100%  accuracy.     Other Barriers to Discharge (Family Training, etc): TBD

## 2021-06-12 NOTE — PLAN OF CARE
"RN [id number]    FOCUS/GOAL  Pain management, Medical management, Mobility, Cognition/Memory/Judgment/Problem solving, and Safety management    ASSESSMENT, INTERVENTIONS AND CONTINUING PLAN FOR GOAL:  A/Ox4. On RA. Denies any pain, CP, SOB, N/T, N/V, dizziness. CGA x1, voiding without difficulty, LBM today. Reg thin diet, good appetite. Continuing 1800 FR. Alarms on for safety. No acute events. Took shower this evening. Pt able to make needs known and call light within reach. Family at bedside. Continue POC with contact precautions and seizure precautions.    /65 (BP Location: Right arm)   Pulse 88   Temp 97.3  F (36.3  C) (Oral)   Resp 20   Ht 1.676 m (5' 6\")   Wt 70.1 kg (154 lb 9.6 oz)   SpO2 98%   BMI 24.95 kg/m      "

## 2021-06-12 NOTE — PLAN OF CARE
"Discharge Planner Post-Acute Rehab PT:      Discharge Plan: Home with spouse, 5 COMPA with R rail. OP PT.     Precautions: Falls, Seizure     Current Status:  Bed Mobility: IND  Transfer: CGA without device  Gait: 500+ ft CGA without device. Occasional min A to help maintain upright d/t L/R sway and intermittent scissoring pattern  Stairs: 12x6\" steps with R rail, reciprocal, CGA  Balance: MURRAY on 6/8: 38/56     Assessment:  Continuing to focus on static and dynamic standing balance to improve safety with mobility and reduce risk of falls. Pt with good tolerance for today's session, requiring only several short seated rest breaks d/t fatigue. Denies previous L gastroc/soleus pain, and no increase in neck pain noted with ex.     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

## 2021-06-12 NOTE — TELEPHONE ENCOUNTER
Called patient to discuss pain diary for confirmatory bilateral L3,L4,L5 medial branch block. Patient with positive results and wishes to proceed to Medial Branch Radiofrequency Ablation. Order has been placed and initiated PA process. Patient will be contacted once PA received and scheduled at his convenience. Discussed procedure requirements and patient verbalized understanding. Encouraged to call with any additional questions or concerns.    Courtney Schuler RN [facility] 11/2/2020 1:47 PM

## 2021-06-12 NOTE — PLAN OF CARE
Patient is alert and oriented x4. Makes needs known. PRN bengay used x1 for posterior neck pain. CGA without device. LBM today, voids spontaneously in toilet.  VSS. Alarms on. Call light within reach.

## 2021-06-12 NOTE — PATIENT INSTRUCTIONS - HE
[facility] Pulmonary Progress Note    Patient: Melanie Stovall  1951   MR# [id number]   Acct# [id number]  07/04/17   9:26 AM  Referring Provider: Fadi Pabon DO      Problem list:   Patient Active Problem List   Diagnosis   • Acute respiratory failure      Chief Complaint: COPD exacerbation, hypercapnia    Interval history: Patient is awake and alert and sitting up in the bed breathing comfortably on 2L with a saturation of 100%. She did not sleep on the bipap last night. She has apparently had some type of positive pressure airway device at home in the past but was intolerant of the mask. She denies shortness of air or pain and wants to go to a regular room today if possible. No other aggravating, alleviating factors or associated symptoms.    HPI    Allergy:   Allergies   Allergen Reactions   • Morphine And Related Anaphylaxis   • Ativan [Lorazepam]        Meds:      apixaban 5 mg Oral BID   budesonide-formoterol 2 puff Inhalation BID - RT   diazePAM 5 mg Oral Q8H   doxycycline 100 mg Oral Q12H   famotidine 20 mg Oral BID   guaiFENesin 1,200 mg Oral Q12H   ipratropium-albuterol 3 mL Nebulization Q4H - RT   levothyroxine 150 mcg Oral Q AM   methylPREDNISolone sodium succinate 40 mg Intravenous Q12H   nicotine 1 patch Transdermal Q24H   sotalol 80 mg Oral BID        Review of Systems  Constitutional: Negative for chills and fever.   HENT: Negative for congestion and sinus pressure.   Respiratory: Positive for shortness of breath (improving) and wheezing. Negative for cough and stridor.   Cardiovascular: Negative for chest pain.   Gastrointestinal: Negative for vomiting.   Neurological: Negative for headaches.   Psychiatric/Behavioral: Negative for agitation.   All other systems reviewed and are negative.    Physical Exam:  Vitals:    07/04/17 0802   BP: 124/56   Pulse: 51   Resp: 18   Temp: 97.2 °F (36.2 °C)   SpO2: 94%       Intake/Output Summary (Last 24 hours) at 07/04/17 0926  Last data filed at 07/04/17 0700    Please complete your pain diary and return the diary to the Spine Center at your earliest convenience.  The Sp[facility] Center will contact you to schedule your next appointment after your pain diary is reviewed by your doctor.  Thank you.    DISCHARGE INSTRUCTIONS    During office hours (8:00 a.m.- 4:00 p.m.) questions or concerns may be answered  by calling Sp[facility] Center Navigation Nurses at  [phone].  Messages received after hours will be returned the following business day.      In the case of an emergency, please dial 911 or seek assistance at the nearest Emergency Room/Urgent Care facility.     All Patients:  ? You may experience an increase in your symptoms for the first 2 days, once the numbing medication wears off.    ? You may resume your regular medication, no pain medication until you have completed your diary    ? You may shower. No swimming, tub bath or hot tub for 24 hours; remove bandage after 4 hours    ? Continue your activities that can cause you pain to test the blocks.                         ? You should not drive for the next 3 to 5 hours (have someone drive you)           POSSIBLE PROCEDURE SIDE EFFECTS  -Call Spine Center if concerned-    Increased Pain  Increased numbness/tingling     Nausea/Vomiting  Bruising/bleeding at site (hematoma)             Swelling at site (edema) Headache  Difficulty walking  Infection        Fever greater than 100.5           Gross per 24 hour   Intake             1320 ml   Output             3225 ml   Net            -1905 ml     Physical Exam  Constitutional: She appears well-developed. Non-toxic appearance. She appears chronically ill. No distress. Nasal cannula in place.   HENT:   Head: Normocephalic and atraumatic.   Nose: Nose normal.   Eyes: EOM are normal. No scleral icterus.   Neck: No JVD present. No tracheal deviation present.   Cardiovascular: Normal rate and regular rhythm.   Pulmonary/Chest: Effort normal. No respiratory distress. She has no wheezes. She has no rales.   Abdominal: Soft. There is no tenderness. There is no guarding.   Musculoskeletal: She exhibits no edema.   Neurological: She is alert but somewhat lethargic.   Skin: Skin is warm and dry. She is not diaphoretic.   Psychiatric: She has a normal mood and affect. Her behavior is normal.     Data:     Results from last 7 days  Lab Units 07/04/17  0242 07/03/17  0201 07/02/17  0346   WBC 10*3/mm3 4.76* 3.41* 11.11*   HEMOGLOBIN g/dL 10.6* 11.7* 13.3   PLATELETS 10*3/mm3 178 206 196         Results from last 7 days  Lab Units 07/04/17  0242 07/03/17  0928 07/03/17  0201 07/02/17  0346   SODIUM mmol/L 138  --  139 141   SODIUM, ARTERIAL mmol/L  --  135.8  --   --    POTASSIUM mmol/L 4.5  --  4.8 4.9   BUN mg/dL 19  --  15 12   CREATININE mg/dL 0.48*  --  0.55 0.63         Results from last 7 days  Lab Units 07/03/17  0928 07/03/17  0640 07/02/17  0730 07/02/17  0409   PH, ARTERIAL pH units 7.394 7.299* 7.367 7.242*   PCO2, ARTERIAL mm Hg 53.4* 68.3* 46.1* 54.6*   PO2 ART mm Hg 65.0* 89.4 88.8 84.4   FIO2 % 30  --  30 30     Radiology:  Imaging Results (last 24 hours)     ** No results found for the last 24 hours. **        Pulmonary Assessment:  1. COPD, acute exacerbation-better  2. Tobacco abuse  3. Respiratory acidosis-better  4.  Acute on chronic hypercapnic respiratory failure-stable    Plan:   · Hilda is a DNI  · OK to the floor  · Will check her before she  goes home to see if she qualifies for a bipap at home as she has had one in the past. She may not be able to tolerate it as she has had some trouble here.   · Discussed and recommended total smoking cessation to continue after discharge    Electronically signed by LICO Fry, 07/04/17, 9:26 AM     Physician substantive contribution:  Pertinent symptoms/interval history include: shortness of breath, better  Respiratory exam shows pertinent findings of:diminished breath sounds, improved.  Plan includes: continue RT and will assess need for bipap at home.  I have seen and examined patient personally, performing a face-to-face diagnostic evaluation with plan of care reviewed and developed with APRN and nursing staff. I have addended and/or modified the above history of present illness, physical examination, and assessment and plan to reflect my findings and impressions. Essential elements of the care plan were discussed with APRN above.  Agree with findings and assessment/plan as documented above.    Electronically signed by Pj Mello MD, on 7/4/2017, 1:19 PM
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
## 2021-06-12 NOTE — PLAN OF CARE
FOCUS/GOAL  Medication management, Pain management, Medical management, Reinforcement of self-care/ADL, and Safety management    ASSESSMENT, INTERVENTIONS AND CONTINUING PLAN FOR GOAL:  Pt was A/O, forgetful and with minimal word-finding difficulty at times, able to use call light and make needs known to staff. Denies pain, SOB, chest pain nor dizziness. On regular diet, thin liquids, takes medicines whole, on 1800ml/day fluid restriction. CGA walker with transfers. Cont of BL, no BM this shift, LBM-6/11/2021. VSS. Will continue with current POC.

## 2021-06-12 NOTE — TELEPHONE ENCOUNTER
Call to patient to inform that PA in not necessary to proceed to schedule for bilateral L3,L,L5 MBB with Dr Chopra. Discussed procedure requirements and patient verbalized. Patient is scheduled for 11/20. Encouraged to call clinic with any additional questions or concerns.    Courtney Schuler RN [facility] 11/9/2020 2:53 PM

## 2021-06-13 ENCOUNTER — APPOINTMENT (OUTPATIENT)
Dept: PHYSICAL THERAPY | Facility: CLINIC | Age: 56
End: 2021-06-13
Payer: COMMERCIAL

## 2021-06-13 ENCOUNTER — APPOINTMENT (OUTPATIENT)
Dept: SPEECH THERAPY | Facility: CLINIC | Age: 56
End: 2021-06-13
Payer: COMMERCIAL

## 2021-06-13 ENCOUNTER — APPOINTMENT (OUTPATIENT)
Dept: OCCUPATIONAL THERAPY | Facility: CLINIC | Age: 56
End: 2021-06-13
Payer: COMMERCIAL

## 2021-06-13 PROCEDURE — 97112 NEUROMUSCULAR REEDUCATION: CPT | Mod: GP | Performed by: PHYSICAL THERAPIST

## 2021-06-13 PROCEDURE — 97750 PHYSICAL PERFORMANCE TEST: CPT | Mod: GP | Performed by: PHYSICAL THERAPIST

## 2021-06-13 PROCEDURE — 250N000013 HC RX MED GY IP 250 OP 250 PS 637: Performed by: PHYSICAL MEDICINE & REHABILITATION

## 2021-06-13 PROCEDURE — 97129 THER IVNTJ 1ST 15 MIN: CPT | Mod: GN | Performed by: SPEECH-LANGUAGE PATHOLOGIST

## 2021-06-13 PROCEDURE — 128N000003 HC R&B REHAB

## 2021-06-13 PROCEDURE — 97535 SELF CARE MNGMENT TRAINING: CPT | Mod: GO

## 2021-06-13 PROCEDURE — 97130 THER IVNTJ EA ADDL 15 MIN: CPT | Mod: GN | Performed by: SPEECH-LANGUAGE PATHOLOGIST

## 2021-06-13 PROCEDURE — 99231 SBSQ HOSP IP/OBS SF/LOW 25: CPT | Mod: GC | Performed by: STUDENT IN AN ORGANIZED HEALTH CARE EDUCATION/TRAINING PROGRAM

## 2021-06-13 PROCEDURE — 97110 THERAPEUTIC EXERCISES: CPT | Mod: GP | Performed by: PHYSICAL THERAPIST

## 2021-06-13 PROCEDURE — 250N000013 HC RX MED GY IP 250 OP 250 PS 637: Performed by: PHYSICIAN ASSISTANT

## 2021-06-13 PROCEDURE — 97530 THERAPEUTIC ACTIVITIES: CPT | Mod: GO

## 2021-06-13 RX ADMIN — FAMOTIDINE 10 MG: 10 TABLET ORAL at 08:56

## 2021-06-13 RX ADMIN — SIMVASTATIN 20 MG: 20 TABLET, FILM COATED ORAL at 20:43

## 2021-06-13 RX ADMIN — DIVALPROEX SODIUM 250 MG: 250 TABLET, DELAYED RELEASE ORAL at 20:44

## 2021-06-13 RX ADMIN — ASPIRIN 81 MG CHEWABLE TABLET 81 MG: 81 TABLET CHEWABLE at 08:56

## 2021-06-13 RX ADMIN — Medication 5 MG: at 20:44

## 2021-06-13 RX ADMIN — ACETAMINOPHEN 650 MG: 325 TABLET, FILM COATED ORAL at 08:57

## 2021-06-13 RX ADMIN — LIDOCAINE 1 PATCH: 560 PATCH PERCUTANEOUS; TOPICAL; TRANSDERMAL at 08:57

## 2021-06-13 RX ADMIN — FAMOTIDINE 10 MG: 10 TABLET ORAL at 20:43

## 2021-06-13 NOTE — PROGRESS NOTES
[facility] Acute Rehabilitation Unit Progress Note    Patient Name: Otoniel Gomez   YOB: 1965  MRN: [id number]     Age / Sex: 55 year old male    ASSESSMENT/PLAN:  Otoniel Gomez is a 55 year old male in acute rehab for malignant neoplasm of brain s/p crani and tumor resection. Admitted on 6/8/2021  Patient progressing through therapies, with current sustaining cares meeting needs. See primary team note for details.  - EMR reviewed, no acute overnight events, vitals stable.  - labs, imaging none new.  - continue multidisciplinary therapies and plan of care.    SUBJECTIVE/INTERVAL HX:    Patient was seen and examined at bedside rounds. Reports feeling well. Endorses ongoing neck and back pain. Some relief of these pains is provided by tylenol and lidocaine patches. Continent of bowel and bladder. Still exhibiting signs of impulsivity at times.       PHYSICAL EXAM:  Vitals: Temp: 95.8  F (35.4  C) Temp src: Oral BP: 111/75 Pulse: 91   Resp: 16 SpO2: 99 % O2 Device: None (Room air)    Gen: No acute distress, pleasant, resting in bed   HEENT: hearing present to speaking voice, wearing glasses   CVS: no bilateral lower extremity edema noted  Resp: breathing unlabored at rest on room air  Abd: nondistended  MSK: moving all limbs spontaneously, greater than antigravity strength throughout.   Neuro: A&O, communicative  Psych: nl affect    Pt discussed with Dr. Lopez.     Olga Huerta  PGY-4 PM&R Resident  [facility] Acute Rehab  Pager: [phone]

## 2021-06-13 NOTE — PLAN OF CARE
Pt continent of B/B, up to BR to void.  Pt had no c/o pain, CHAUDHRY and no seizure activity.  Pt OOB, setting off bed alarm x1.  Pt reminded to call for assistance.

## 2021-06-13 NOTE — PLAN OF CARE
"RN [id number]    FOCUS/GOAL  Pain management, Medical management, Mobility, Cognition/Memory/Judgment/Problem solving, and Safety management    ASSESSMENT, INTERVENTIONS AND CONTINUING PLAN FOR GOAL:  A/Ox4. On RA. Denies any pain, CP, SOB, N/T, N/V, dizziness. CGA x1 without assistive device. Voiding without difficulty, LBM today. Reg thin diet, good appetite. Continuing 1800 FR, 250 ml consumed this shift. Alarms on for safety. No acute events. Pt able to make needs known and call light within reach. Continue POC with contact and seizure precautions.    BP 98/69 (BP Location: Right arm)   Pulse 83   Temp 96.8  F (36  C) (Oral)   Resp 16   Ht 1.676 m (5' 6\")   Wt 70.1 kg (154 lb 9.6 oz)   SpO2 96%   BMI 24.95 kg/m     "

## 2021-06-13 NOTE — PLAN OF CARE
Khushi Fraser, SLP   Speech Therapist   Speech Language Pathology   Plan of Care   Addendum   Date of Service:  6/12/2021  8:43 AM   Creation Time:  6/12/2021  8:43 AM            Addendum             []Hide copied text    []Derick for details  Discharge Planner Post-Acute Rehab SLP:      Discharge Plan: Home with spouse. Outpatient SLP.     Precautions: Fall     Current Status:  Communication: Mildly impaired word-finding at conversation level. Auditory comprehension, reading comprehension, verbal expression appear WFL.  Cognition: RBANS administered 6/10/21. Moderate-severe cognitive-linguistic impairment.  Swallow: Regular and thin diet. Do not anticipate further needs.     Assessment:   Other Barriers to Discharge (Family Training, etc): TBD

## 2021-06-13 NOTE — PLAN OF CARE
"Discharge Planner Post-Acute Rehab PT:      Discharge Plan: Home with spouse, 5 COMPA with R rail. OP PT.     Precautions: Falls, Seizure     Current Status:  Bed Mobility: IND  Transfer: SBA without device  Gait: 500+ ft SBA without device. Occasional CGA/min A with dynamic gait challenges to help maintain upright d/t L/R sway  Stairs: 12x6\" steps with R rail, reciprocal, SBA/CGA  Balance: A of -  TU.7 sec without device  30\" sit<>stand: 13x without UE A  MURRAY/56  FGA:      Assessment:  Continuing to focus on static and dynamic standing balance to improve safety with mobility and reduce risk of falls. Pt with chronic LBP, but able to independently remember most of his prior HEP for this, and post performance, notes improved intensity of pain. \"It always feels better after I do my exercises.\" - plan to incorporate into PT sessions for improved pain control, especially considering chronicity. Plan to consult with OT/SLP tomorrow regarding safety with transition to mod I in room, considering above balance scores.     Other Barriers to Discharge (DME, Family Training, etc): Balance, OCM impairments from previous cerebellar resection  "

## 2021-06-13 NOTE — PLAN OF CARE
Discharge Planner Post-Acute Rehab SLP:      Discharge Plan: Home with spouse. Outpatient SLP.     Precautions: Fall     Current Status:  Communication: Mildly impaired word-finding at conversation level. Auditory comprehension, reading comprehension, verbal expression appear WFL.  Cognition: RBANS administered 6/10/21. Moderate-severe cognitive-linguistic impairment.  Swallow: Regular and thin diet. Do not anticipate further needs.     Assessment: sLP: worked on numerical reasoning regarding time concepts, moderate difficulty with understanding questions, working memory and computations.    Other Barriers to Discharge (Family Training, etc): TBD

## 2021-06-13 NOTE — PLAN OF CARE
Discharge Planner Post-Acute Rehab OT:      Discharge Plan: Home with spouse, OP OT.      Precautions: Falls, cognitive impairment.     Current Status:  ADLs: SBA transfers/mobility with fww, CGA without device. SBA-CGA ADLs.  IADLs: EFPT-see POC note 6/11.   Vision/Cognition: Corrective saccades with horizontal tracking. Scoring 18/30 on SLUMs, functionally demonstrating deficits with recall, problem solving, and insight.     Assessment: Pt tolerated cognitive tasks well. Pt required increased processing time, and assistance with recall and problem solving. During functional ambulation Pt requires mod A for environmental navigation to locate room and therapy gym.  Pt will benefit from ongoing skilled OT intervention to progress IND ADLs/IADLs.     Other Barriers to Discharge (DME, Family Training, etc):   DME/AE: shower chair, bathroom grab bars, possibly fww.   Caregiver training: recommended with spouse prior to discharge.

## 2021-06-13 NOTE — PROGRESS NOTES
Pt had no c/o SOB/CHAUDHRY and no supplemental O2 needed.  Pt continent of B/B, no c/o pain.  Pt slept.

## 2021-06-13 NOTE — PLAN OF CARE
Patient is alert and oriented x4. Makes needs known. PRN tylenol given x1 and lidocaine patch applied to posterior neck. CGA without device. LBM today, voids spontaneously in toilet. Fluid intake this shift about 900 ml. VSS. Alarms on. Call light within reach.

## 2021-06-14 ENCOUNTER — APPOINTMENT (OUTPATIENT)
Dept: SPEECH THERAPY | Facility: CLINIC | Age: 56
End: 2021-06-14
Payer: COMMERCIAL

## 2021-06-14 ENCOUNTER — APPOINTMENT (OUTPATIENT)
Dept: OCCUPATIONAL THERAPY | Facility: CLINIC | Age: 56
End: 2021-06-14
Payer: COMMERCIAL

## 2021-06-14 ENCOUNTER — APPOINTMENT (OUTPATIENT)
Dept: PHYSICAL THERAPY | Facility: CLINIC | Age: 56
End: 2021-06-14
Payer: COMMERCIAL

## 2021-06-14 LAB
ANION GAP SERPL CALCULATED.3IONS-SCNC: 6 MMOL/L (ref 3–14)
BUN SERPL-MCNC: 29 MG/DL (ref 7–30)
CALCIUM SERPL-MCNC: 8.5 MG/DL (ref 8.5–10.1)
CHLORIDE SERPL-SCNC: 103 MMOL/L (ref 94–109)
CO2 SERPL-SCNC: 29 MMOL/L (ref 20–32)
CREAT SERPL-MCNC: 1.23 MG/DL (ref 0.66–1.25)
ERYTHROCYTE [DISTWIDTH] IN BLOOD BY AUTOMATED COUNT: 14.4 % (ref 10–15)
GFR SERPL CREATININE-BSD FRML MDRD: 66 ML/MIN/{1.73_M2}
GLUCOSE SERPL-MCNC: 84 MG/DL (ref 70–99)
HCT VFR BLD AUTO: 30 % (ref 40–53)
HGB BLD-MCNC: 9.7 G/DL (ref 13.3–17.7)
MCH RBC QN AUTO: 28.6 PG (ref 26.5–33)
MCHC RBC AUTO-ENTMCNC: 32.3 G/DL (ref 31.5–36.5)
MCV RBC AUTO: 89 FL (ref 78–100)
PLATELET # BLD AUTO: 302 10E9/L (ref 150–450)
POTASSIUM SERPL-SCNC: 4.5 MMOL/L (ref 3.4–5.3)
RBC # BLD AUTO: 3.39 10E12/L (ref 4.4–5.9)
SODIUM SERPL-SCNC: 138 MMOL/L (ref 133–144)
WBC # BLD AUTO: 4.9 10E9/L (ref 4–11)

## 2021-06-14 PROCEDURE — 36415 COLL VENOUS BLD VENIPUNCTURE: CPT | Performed by: PHYSICIAN ASSISTANT

## 2021-06-14 PROCEDURE — 97112 NEUROMUSCULAR REEDUCATION: CPT | Mod: GP | Performed by: PHYSICAL THERAPIST

## 2021-06-14 PROCEDURE — 85027 COMPLETE CBC AUTOMATED: CPT | Performed by: PHYSICIAN ASSISTANT

## 2021-06-14 PROCEDURE — 99232 SBSQ HOSP IP/OBS MODERATE 35: CPT | Performed by: PHYSICAL MEDICINE & REHABILITATION

## 2021-06-14 PROCEDURE — 97129 THER IVNTJ 1ST 15 MIN: CPT | Mod: GO

## 2021-06-14 PROCEDURE — 97129 THER IVNTJ 1ST 15 MIN: CPT | Mod: GN | Performed by: SPEECH-LANGUAGE PATHOLOGIST

## 2021-06-14 PROCEDURE — 97110 THERAPEUTIC EXERCISES: CPT | Mod: GP | Performed by: PHYSICAL THERAPIST

## 2021-06-14 PROCEDURE — 250N000013 HC RX MED GY IP 250 OP 250 PS 637: Performed by: PHYSICAL MEDICINE & REHABILITATION

## 2021-06-14 PROCEDURE — 97535 SELF CARE MNGMENT TRAINING: CPT | Mod: GO

## 2021-06-14 PROCEDURE — 97130 THER IVNTJ EA ADDL 15 MIN: CPT | Mod: GO

## 2021-06-14 PROCEDURE — 97130 THER IVNTJ EA ADDL 15 MIN: CPT | Mod: GN | Performed by: SPEECH-LANGUAGE PATHOLOGIST

## 2021-06-14 PROCEDURE — 250N000013 HC RX MED GY IP 250 OP 250 PS 637: Performed by: PHYSICIAN ASSISTANT

## 2021-06-14 PROCEDURE — 80048 BASIC METABOLIC PNL TOTAL CA: CPT | Performed by: PHYSICIAN ASSISTANT

## 2021-06-14 PROCEDURE — 97530 THERAPEUTIC ACTIVITIES: CPT | Mod: GP | Performed by: PHYSICAL THERAPIST

## 2021-06-14 PROCEDURE — 128N000003 HC R&B REHAB

## 2021-06-14 RX ORDER — DIVALPROEX SODIUM 125 MG/1
125 TABLET, DELAYED RELEASE ORAL AT BEDTIME
Status: DISCONTINUED | OUTPATIENT
Start: 2021-06-14 | End: 2021-06-17

## 2021-06-14 RX ADMIN — ACETAMINOPHEN 650 MG: 325 TABLET, FILM COATED ORAL at 20:17

## 2021-06-14 RX ADMIN — FAMOTIDINE 10 MG: 10 TABLET ORAL at 20:16

## 2021-06-14 RX ADMIN — ASPIRIN 81 MG CHEWABLE TABLET 81 MG: 81 TABLET CHEWABLE at 08:53

## 2021-06-14 RX ADMIN — Medication 5 MG: at 20:16

## 2021-06-14 RX ADMIN — ACETAMINOPHEN 650 MG: 325 TABLET, FILM COATED ORAL at 16:07

## 2021-06-14 RX ADMIN — LIDOCAINE 1 PATCH: 560 PATCH PERCUTANEOUS; TOPICAL; TRANSDERMAL at 08:53

## 2021-06-14 RX ADMIN — DIVALPROEX SODIUM 125 MG: 125 TABLET, DELAYED RELEASE ORAL at 20:16

## 2021-06-14 RX ADMIN — SIMVASTATIN 20 MG: 20 TABLET, FILM COATED ORAL at 20:16

## 2021-06-14 RX ADMIN — FAMOTIDINE 10 MG: 10 TABLET ORAL at 08:53

## 2021-06-14 RX ADMIN — ACETAMINOPHEN 650 MG: 325 TABLET, FILM COATED ORAL at 07:57

## 2021-06-14 NOTE — PLAN OF CARE
FOCUS/GOAL  Medical management, Mobility and Reinforcement of self-care/ADL    ASSESSMENT, INTERVENTIONS AND CONTINUING PLAN FOR GOAL:  Patient is alert/oriented, has been able to use call light appropriately, was progressed to be Ind in room w/o Ad this shift.  Patient reported HA this am and requested PRN tylenol x1 which was effective.  Patient is voiding spontaneously, neck incision is approximated healing, denies dizziness when up.  Patient's vitals are stable. Patient still on fluid restriction and is aware and cooperative.  No additional care concerns at this time, continue with POC.      Addendum: Patient had his full pitcher of water in the room, per report patient has been aware of fluid restriction and was reviewed this morning but patient polished off the 700ml on this shift, next shift aware to continue to monitor fluid more rigidly with patient.  This was reiterated with patient at this time that may be limited what will be allowed to drink this evening.

## 2021-06-14 NOTE — PLAN OF CARE
Discharge Planner Post-Acute Rehab SLP:      Discharge Plan: Home with spouse. Outpatient SLP.     Precautions: Fall     Current Status:  Communication: Mildly impaired word-finding at conversation level. Auditory comprehension, reading comprehension, verbal expression appear WFL.  Cognition: . Moderate cognitive-linguistic impairment.  Swallow: Regular and thin diet.      Assessment:  SLP: worked on numerical reasoning, word problems dealing with time, pt needing moderate assist for computations/reasoning.  introduced activities (on IPAD) for planning, sequencing, needing cues for strategies and correcting errors.  noting at least mild difficulty with memory tasks -cueing pt to verbalize to help self cue  Other Barriers to Discharge (Family Training, etc): TBD

## 2021-06-14 NOTE — PATIENT INSTRUCTIONS - HE
DISCHARGE INSTRUCTIONS    During office hours (8:00 a.m.- 4:00 p.m.) questions or concerns may be answered  by calling [facility] Navigation Nurses at  [phone].  Messages received after hours will be returned the following business day.      In the case of an emergency, please dial 911 or seek assistance at the nearest Emergency Room/Urgent Care facility.     All Patients:  ? You may experience an increase in your symptoms for the first 2 days, once the numbing medication wears off.    ? You may resume your regular medication, no pain medication until you have completed your diary    ? You may shower. No swimming, tub bath or hot tub for 24 hours; remove bandage after 4 hours    ? Continue your activities that can cause you pain to test the blocks.                         ? You should not drive for the next 3 to 5 hours (have someone drive you)           POSSIBLE PROCEDURE SIDE EFFECTS  -Call Spine Center if concerned-    Increased Pain  Increased numbness/tingling     Nausea/Vomiting  Bruising/bleeding at site (hematoma)             Swelling at site (edema) Headache  Difficulty walking  Infection        Fever greater than 100.5

## 2021-06-14 NOTE — PLAN OF CARE
"Discharge Planner Post-Acute Rehab PT:      Discharge Plan: Home with spouse, 5 COMPA with R rail. OP PT.     Precautions: Falls, Seizure     Current Status:  Bed Mobility: IND  Transfer: IND without device  Gait: 500+ ft IND without device. Occasional L/R sway but does well with appropriate stepping, ankle strategies to maintain upright  Stairs: 12x6\" steps with R rail, reciprocal, SBA  Balance: A of -  TU.7 sec without device  30\" sit<>stand: 13x without UE A  MURRAY/56  FGA:      Assessment:  Pt transitioned to IND in room without device, considering improving cognition per OT and relatively low risk for future falls per above balance testing. Reports good relief of LBP with his previous HEP. Neck pain relatively unchanged, except for increase with any activities with forward flexion.     Other Barriers to Discharge (DME, Family Training, etc): Balance, OCM impairments from previous cerebellar resection; Cognition  "

## 2021-06-14 NOTE — PROGRESS NOTES
"  [facility]   Acute Rehabilitation Unit  Daily progress note    INTERVAL HISTORY  Otoniel Gomez was seen and examined this morning.  Weekend therapy and progress notes reviewed, no acute events reported.  Noted some soft BP.  Patient reports that he is feeling well.  Daughter and wife present at bedside.  They agree he is making progress with therapies and cognition gradually improving.  He has some ongoing neck pain.  Denies shortness of breath, dizziness, nausea, bowel or bladder concerns.  AM labs reviewed, note to have stable anemia, stable/normal sodium level.    Functionally, therapists approved for independence in room without assistive device.  OT did some cognitive testing over the weekend, noting deficits with recall, attention, and higher-level problem solving and recommending assistance with meal prep, phone calls, appointments, medications, and finances.  Planning for caregiver training prior to discharge.      MEDICATIONS    aspirin  81 mg Oral Daily     divalproex sodium delayed-release  125 mg Oral At Bedtime     famotidine  10 mg Oral BID     lidocaine  1 patch Transdermal Q24h    And     lidocaine   Transdermal Q8H     melatonin  5 mg Oral At Bedtime     simvastatin  20 mg Oral At Bedtime        acetaminophen, menthol (Topical Analgesic) 2.5%, polyethylene glycol, senna-docusate     PHYSICAL EXAM  /80 (BP Location: Right arm)   Pulse 75   Temp 97  F (36.1  C) (Oral)   Resp 18   Ht 1.676 m (5' 6\")   Wt 70.1 kg (154 lb 9.6 oz)   SpO2 97%   BMI 24.95 kg/m      Gen: NAD, resting in bed   HEENT: well-healed scar right parietal region  CVS: RRR  Pulm: non-labored on room air  Abd: soft, non-tender, non-distendede  Ext: no edema in bilateral lower extremities    Neuro/MSK: alert, responds appropriately though slowed, follows basic commands     LABS  CBC RESULTS:   Recent Labs   Lab Test 06/14/21  0742 06/11/21  0759 06/10/21  0653   WBC 4.9 6.2 5.9   RBC " 3.39* 3.49* 3.21*   HGB 9.7* 9.8* 9.0*   HCT 30.0* 31.5* 27.5*   MCV 89 90 86   MCH 28.6 28.1 28.0   MCHC 32.3 31.1* 32.7   RDW 14.4 14.6 14.5    273 233     Last Basic Metabolic Panel:  Recent Labs   Lab Test 06/14/21  0742 06/11/21  0759 06/10/21  0653    135 133   POTASSIUM 4.5 4.8 4.6   CHLORIDE 103 102 99   CO2 29 25 28   ANIONGAP 6 8 6   GLC 84 86 84   BUN 29 32* 27   CR 1.23 1.40* 1.31*   GFRESTIMATED 66 56* 61   JULIO 8.5 9.1 9.0     ASSESSMENT AND PLAN  Otoniel Gomez is a 55 year old male with past medical history of 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  He is admitted to ARU on 6/8/21 for rehabilitation and ongoing medical management.       Rehabilitation - continue comprehensive acute inpatient rehabilitation program with multidisciplinary approach including therapies, rehab nursing, and physiatry following. See interval history for updates.     Medical issues   Seizures  Received valium per EMS, ongoing seizures.  Loaded with Keppra, benzodiazepine in ED. Neurology consulted.  Likely precipitated by severe hyponatremia.  Required intubation for airway protection.  EEG consistent with severe encephalopathy consistent with postictal state, sedation, hyponatremia, but no seizure activity observed.  Per neuro, likely will have prolonged recovery time given history of brain tumor and numerous chronic microhemorrhages, large amount of sedation that was initially used, and recovering hyponatremia.  Seizures resolved with stabilization of sodium levels.  Neurology did not feel AEDs indicated given underlying cause  (hyponatremia) treated.  - Seizure precautions     Incidental CVA  Noted on brain MRI 5/22 with 2 small infarcts in R temporal lobe.  Neurology following patient and felt these areas were incidental findings, unrelated to his current presentation.  Repeat brain MRI 5/27 with interval increase in size of 2 areas of acute infarct in R temporal lobe.  - Continue PTA ASA 81 mg  - Continue PT/OT/SLP    Acute delirium/encephalopathy   Noted beginning evening of 5/29.  Associated with confusion, agitation, visual hallucinations, combativeness, impulsive, pulling at lines, devices and threatening to hit at staff.  Felt to be likely in the setting of underlying brain pathology from medulloblastoma, significant hyponatremia early hospital course, coexisting temporal stroke.  Transferred to ICU and received Precedex infusion.  Psychiatry consulted, started on scheduled Seroquel and IV valproic acid.  Seroquel has since been weaned down to as needed and delayed release oral Depakote started at bedtime, currently at 250 mg.  No ongoing significant agitation since 6/2.  Elected to defer repeat brain MRI due to contrast (JACK) and mental status now returned to baseline.  - Monitor for recurrence  - Decrease Depakote to 125 mg delayed-release tablet at bedtime.  Wean as able.  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  Cr jumped from 0.88 to peak of 1.55, down-trending since.  Also suspect possible contribution of reduced oral fluid intake, NPO due to dysphagia on 5/31.  Kidney function improved with IV hydration, though Cr still remains elevated at 1.40 on 6/7.  - Cr stable/improved slightly at  1.23 today.    - Trend BMP  - Avoid further IV dye for now    Hyponatremia   Severe. Initial sodium 109 with unintentional rapid correction to 123, D5W given with reduction to 120.  Managed by nephrology.  [place] likely related to polydipsia since urine osmolality was not elevated and ADH physiology.  Patient was also on sertraline as outpatient, which was discontinued due to possible contribution.  Sodium with ongoing improvement, stable at 138.  - Na stable today at 138  - Trend BMP  - Continue fluid restriction of 1.8 L; dietitian will provide education as he will go home on FR   - Per nephrology, avoid proton pump inhibitors, ACE inhibitors, thiazide diuretics, and SSRI medications for now.    Possible aspiration pneumonia  Acute respiratory failure with hypoxia, resolved  Required intubation for airway protection.  Extubated on 5/23 with no further airway concerns or hypoxia.  Productive cough initially.  Mild interstitial prominence of right lung base on x-ray.  Sputum culture with MRSA. Sensitivity noted.  Patient treated with IV vancomycin, with stop date of 5/31.  - Monitor respiratory status  - Encourage IS  - Supplemental oxygen PRN    Gastroesophageal reflux  1 episode of emesis night of 6/4 but seemed related to specific food.  Elected to defer GI consult with no clear indication for EGD.  Started on famotidine rather than PPI due to recent significant hyponatremia.  - Continue famotidine 20 mg daily.    Elevated BP  Patient normotensive at discharge to ARU.   - Remains stable, normotensive to soft at times  - Monitor BP    Nutrition  Dysphagia, resolved  Refused nasogastric tube 5/28.  SLP assessed 5/29 and recommended soft diet.  Discussed with speech therapy 6/2. Dysphagia correlates with declining mental status from encephalopathy.  Speech therapy recommended video swallow for 6/3 with speech to rule out silent aspiration. Results shows multiple episodes of penetration with thin liquid barium. No  "evidence of aspiration.  Diet upgraded to regular diet, thin liquids by speech therapy on 6/4.  - Continue SLP  - Continue regular diet with thin liquids     Hyperlipidemia  PTA simvastatin held in setting of elevated CK (4034 at admission, felt like secondary to seizures).  Per discharge summary, check CK in 2 days and resume simvastatin if normalized.  CK WNL, resumed.  - Continue simvastatin 20 mg at bedtime     Hx PTSD  Follows with psychology, [facility].  Started on sertraline 25 mg daily on 4/9/21.  This admission, PTA sertraline discontinued in setting of severe hyponatremia.  - Continue to hold sertraline  - Monitor for symptoms  - Follow up with outpatient psychiatry to consider alternative to sertraline for PTSD    Posterior neck pain  Present throughout acute hospitalization, per patient some chronic neck pain which he attributes to prior injury \"tubing\".  Denies radiation. Palpable tightness in right trapezius and levator muscles, palpation reproduces patient's pain.  Most likely myofascial pain with some facet mediated pain. No clinical s/s of cervical radiculopathy or myelopathy at this point. Alleviated with lidocaine patch.  - Continue lidocaine patch at night  - Tylenol PRN  - Aqua-k pad   - Discussed consideration for follow-up with PM&R as outpatient for injections if ongoing    Left calf pain  Reports new onset 6/9.  Denies injury of previous history of pain in this area.  No warmth, swelling.  Aggravated by weight-bearing.  Tender to palpation in mid/posterior calf, aggravated by dorsiflexion.  Suspect muscular, possible Achilles tendonitis.  Improved with taping per PT.  - Continue stretching, taping per PT  - Trial ice or heat  - Continue Bengay  - Monitor      1. Adjustment to disability:  Clinical psychology to eval and treat as indicated  2. FEN: regular diet, thin liquids  3. Bowel: continent, monitor, PRN bowel meds available  4. Bladder: continent, PVRs " at admission 21,0,17.  Ongoing monitoring PRN  5. DVT Prophylaxis: mechanical, ambulation  6. GI Prophylaxis: famotidine  7. Code: full  8. Disposition: home  9. ELOS: 7-10 days  10. Follow up Appointments on Discharge: PCP, psychiatry, neurology?, PM&R        Seen and discussed with Dr. David Granados, PM&R staff physician     TAHIR Castle-C  Physical Medicine & Rehabilitation

## 2021-06-14 NOTE — PROGRESS NOTES
Pt A+O x4, continent of B/B, no c/o pain or SOB.  Pt maintains his fluid restriction and OOB to BR to urinate.  Pt uses his call-light appropriately and bed alarm in use at all times.  Pt sleeps between cares.

## 2021-06-14 NOTE — PLAN OF CARE
Discharge Planner Post-Acute Rehab OT:      Discharge Plan: Home with spouse, OP OT.      Precautions: Falls, cognitive impairment.     Current Status:  ADLs: I w/ tsfers and ambulation this date. Ok for mod I to I for bathroom needs.   IADLs: EFPT-see POC note 6/11.   Vision/Cognition: Corrective saccades with horizontal tracking. Scoring 18/30 on SLUMs, functionally demonstrating deficits with recall, problem solving, and insight. Pt completed 2 higher level tasks: bill identification and check writing w/ only 95% accuracy w/ cues 100% accuracy w/ additional time. Pt does need assistance w/ memory recall to locate items in kitchen. Will continue to benefit from cognitive retraining.      Assessment: Focused on dynamic standing/reaching into kitchen cupboard/drawers and transferring/carrying functional items from one place to another w/ supervision and no LOB. Pt engaged in higher level cognitive tasks, see notes above. Agreeable w/ PT to make pt I to his bathroom and for bathroom needs.      Other Barriers to Discharge (DME, Family Training, etc):   DME/AE: shower chair, bathroom, grab bars   Caregiver training: recommended with spouse prior to discharge.

## 2021-06-15 ENCOUNTER — APPOINTMENT (OUTPATIENT)
Dept: SPEECH THERAPY | Facility: CLINIC | Age: 56
End: 2021-06-15
Payer: COMMERCIAL

## 2021-06-15 ENCOUNTER — APPOINTMENT (OUTPATIENT)
Dept: PHYSICAL THERAPY | Facility: CLINIC | Age: 56
End: 2021-06-15
Payer: COMMERCIAL

## 2021-06-15 ENCOUNTER — APPOINTMENT (OUTPATIENT)
Dept: OCCUPATIONAL THERAPY | Facility: CLINIC | Age: 56
End: 2021-06-15
Payer: COMMERCIAL

## 2021-06-15 LAB
LABORATORY COMMENT REPORT: NORMAL
SARS-COV-2 RNA RESP QL NAA+PROBE: NEGATIVE
SPECIMEN SOURCE: NORMAL

## 2021-06-15 PROCEDURE — 250N000013 HC RX MED GY IP 250 OP 250 PS 637: Performed by: PHYSICIAN ASSISTANT

## 2021-06-15 PROCEDURE — 97110 THERAPEUTIC EXERCISES: CPT | Mod: GP | Performed by: PHYSICAL THERAPIST

## 2021-06-15 PROCEDURE — 97530 THERAPEUTIC ACTIVITIES: CPT | Mod: GO

## 2021-06-15 PROCEDURE — 99232 SBSQ HOSP IP/OBS MODERATE 35: CPT | Performed by: PHYSICAL MEDICINE & REHABILITATION

## 2021-06-15 PROCEDURE — 87635 SARS-COV-2 COVID-19 AMP PRB: CPT | Performed by: PHYSICAL MEDICINE & REHABILITATION

## 2021-06-15 PROCEDURE — 97530 THERAPEUTIC ACTIVITIES: CPT | Mod: GP | Performed by: PHYSICAL THERAPIST

## 2021-06-15 PROCEDURE — 128N000003 HC R&B REHAB

## 2021-06-15 PROCEDURE — 250N000013 HC RX MED GY IP 250 OP 250 PS 637: Performed by: PHYSICAL MEDICINE & REHABILITATION

## 2021-06-15 PROCEDURE — 92507 TX SP LANG VOICE COMM INDIV: CPT | Mod: GN

## 2021-06-15 PROCEDURE — 97535 SELF CARE MNGMENT TRAINING: CPT | Mod: GO

## 2021-06-15 PROCEDURE — 97140 MANUAL THERAPY 1/> REGIONS: CPT | Mod: GP | Performed by: PHYSICAL THERAPIST

## 2021-06-15 RX ADMIN — ASPIRIN 81 MG CHEWABLE TABLET 81 MG: 81 TABLET CHEWABLE at 07:48

## 2021-06-15 RX ADMIN — ACETAMINOPHEN 650 MG: 325 TABLET, FILM COATED ORAL at 21:24

## 2021-06-15 RX ADMIN — FAMOTIDINE 10 MG: 10 TABLET ORAL at 21:23

## 2021-06-15 RX ADMIN — DIVALPROEX SODIUM 125 MG: 125 TABLET, DELAYED RELEASE ORAL at 21:24

## 2021-06-15 RX ADMIN — LIDOCAINE 1 PATCH: 560 PATCH PERCUTANEOUS; TOPICAL; TRANSDERMAL at 07:49

## 2021-06-15 RX ADMIN — SIMVASTATIN 20 MG: 20 TABLET, FILM COATED ORAL at 21:23

## 2021-06-15 RX ADMIN — FAMOTIDINE 10 MG: 10 TABLET ORAL at 07:48

## 2021-06-15 RX ADMIN — Medication 5 MG: at 21:23

## 2021-06-15 RX ADMIN — ACETAMINOPHEN 650 MG: 325 TABLET, FILM COATED ORAL at 07:48

## 2021-06-15 RX ADMIN — ACETAMINOPHEN 650 MG: 325 TABLET, FILM COATED ORAL at 15:59

## 2021-06-15 ASSESSMENT — MIFFLIN-ST. JEOR: SCORE: 1462.69

## 2021-06-15 NOTE — PLAN OF CARE
"Discharge Planner Post-Acute Rehab PT:      Discharge Plan: Home with spouse, 5 COMPA with R rail. OP PT.     Precautions: Falls, Seizure     Current Status:  Bed Mobility: IND  Transfer: IND without device  Gait: 500+ ft IND without device. Occasional L/R sway but does well with appropriate stepping, ankle strategies to maintain upright, including on uneven surfaces  Stairs: 12x6\" steps with R rail, reciprocal, SBA  Balance: A of -  TU.7 sec without device  30\" sit<>stand: 13x without UE A  MURRAY/  FGA:      Assessment:  Low back pain well under control with his previous HEP. Initiated ex and manual therapy today to address ongoing neck pain with mobility and postural deficits, with pt demonstrating moderately improved flexion, extension; mildly improved L/R ROT and SB with decreased \"tension\" reported post treatment. Pt's wife and daughter present as start of PM session: completed PT-oriented family training considering familiarity with patient and family, including IND transfers/mobility/floor transfer and SBA outdoor mobility for uneven surfaces. HEP/handout provided as well.     Other Barriers to Discharge (DME, Family Training, etc): Balance, OCM impairments from previous cerebellar resection; Cognition  "

## 2021-06-15 NOTE — PROGRESS NOTES
"  [facility]   Acute Rehabilitation Unit  Daily progress note    INTERVAL HISTORY  Otoniel Gomez was seen and examined this morning.  No acute events reported overnight. He reports that he slept well last night (did not note a difference with lower Depakote dose).  He reports mild lightheadedness upon first standing, expresses strategy to make slow position changes and wait before starting to walk.  He feels like he is getting stronger each day.  Notes some mild ongoing neck pain, alleviated with lidocaine patch.  He denies any ongoing left calf pain.  He notes BM this morning, slightly loose.  Denies abdominal pain, nausea.  Feels he is eating better.  Asks again about his fluid restriction at discharge.    Functionally, he is ambulating 500' independently without device.  He is able to ambulate 12 stairs with standby assist.  He is also independent with transfers without device.  He worked on some higher level cognitive tasks with OT (bill identification and check writing) with 95% accuracy with cues, 100% with additional time. He needed assistance with memory recall.      MEDICATIONS    aspirin  81 mg Oral Daily     divalproex sodium delayed-release  125 mg Oral At Bedtime     famotidine  10 mg Oral BID     lidocaine  1 patch Transdermal Q24h    And     lidocaine   Transdermal Q8H     melatonin  5 mg Oral At Bedtime     simvastatin  20 mg Oral At Bedtime        acetaminophen, menthol (Topical Analgesic) 2.5%, polyethylene glycol, senna-docusate     PHYSICAL EXAM  BP 98/71 (BP Location: Right arm)   Pulse 71   Temp 96.2  F (35.7  C) (Oral)   Resp 18   Ht 1.676 m (5' 6\")   Wt 68 kg (149 lb 14.4 oz)   SpO2 94%   BMI 24.19 kg/m      Gen: NAD, resting in bed   HEENT: well-healed scar right parietal region, lidocaine on right posterior neck  CVS: RRR, no murmurs  Pulm: non-labored on room air, lungs CTA bilaterally  Abd: soft, non-tender, non-distended, bowel sounds " present  Ext: no edema or calf tenderness in bilateral lower extremities    Neuro/MSK: alert, responds appropriately though slowed, follows basic commands     LABS  CBC RESULTS:   Recent Labs   Lab Test 06/14/21  0742 06/11/21  0759 06/10/21  0653   WBC 4.9 6.2 5.9   RBC 3.39* 3.49* 3.21*   HGB 9.7* 9.8* 9.0*   HCT 30.0* 31.5* 27.5*   MCV 89 90 86   MCH 28.6 28.1 28.0   MCHC 32.3 31.1* 32.7   RDW 14.4 14.6 14.5    273 233     Last Basic Metabolic Panel:  Recent Labs   Lab Test 06/14/21  0742 06/11/21  0759 06/10/21  0653    135 133   POTASSIUM 4.5 4.8 4.6   CHLORIDE 103 102 99   CO2 29 25 28   ANIONGAP 6 8 6   GLC 84 86 84   BUN 29 32* 27   CR 1.23 1.40* 1.31*   GFRESTIMATED 66 56* 61   JULIO 8.5 9.1 9.0     ASSESSMENT AND PLAN  Otoniel Gomez is a 55 year old male with past medical history of 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  He is admitted to ARU on 6/8/21 for rehabilitation and ongoing medical management.       Rehabilitation - continue comprehensive acute inpatient rehabilitation program with multidisciplinary approach including therapies, rehab nursing, and physiatry following. See interval history for updates.     Medical issues   Seizures  Received valium per EMS, ongoing seizures.  Loaded with Keppra, benzodiazepine in ED. Neurology consulted.  Likely precipitated by severe hyponatremia.  Required intubation for airway protection.  EEG consistent with severe encephalopathy consistent with postictal state, sedation, hyponatremia, but no seizure activity observed.  Per neuro, likely will have prolonged recovery time  given history of brain tumor and numerous chronic microhemorrhages, large amount of sedation that was initially used, and recovering hyponatremia.  Seizures resolved with stabilization of sodium levels.  Neurology did not feel AEDs indicated given underlying cause (hyponatremia) treated.  - Seizure precautions     Incidental CVA  Noted on brain MRI 5/22 with 2 small infarcts in R temporal lobe.  Neurology following patient and felt these areas were incidental findings, unrelated to his current presentation.  Repeat brain MRI 5/27 with interval increase in size of 2 areas of acute infarct in R temporal lobe.  - Continue PTA ASA 81 mg  - Continue PT/OT/SLP    Acute delirium/encephalopathy   Noted beginning evening of 5/29.  Associated with confusion, agitation, visual hallucinations, combativeness, impulsive, pulling at lines, devices and threatening to hit at staff.  Felt to be likely in the setting of underlying brain pathology from medulloblastoma, significant hyponatremia early hospital course, coexisting temporal stroke.  Transferred to ICU and received Precedex infusion.  Psychiatry consulted, started on scheduled Seroquel and IV valproic acid.  Seroquel has since been weaned down to as needed and delayed release oral Depakote started at bedtime, at 250 mg at ARU admission.  No ongoing significant agitation since 6/2.  Elected to defer repeat brain MRI due to contrast (JACK) and mental status now returned to baseline.  Ongoing wean of Depakote, with decrease to 125 mg at HS on 6/14.  - Monitor for recurrence  - Continue Depakote 125 mg delayed-release tablet at bedtime.  Wean as able.  - Continue Seroquel 6.25 mg BID PRN for agitation  - Continue melatonin 5 mg at bedtime  - Continue PT/OT/SLP     Hx Cerebellar Medulloblastoma s/p surgical resection and chemotherapy 2007  - Brain imaging with post-op changes, no evidence of recurrent mass    Acute kidney injury  Developed JACK, felt likely contrast nephropathy  given timing ~3 days after MRI brain with contrast.  Cr jumped from 0.88 to peak of 1.55, down-trending since.  Also suspect possible contribution of reduced oral fluid intake, NPO due to dysphagia on 5/31.  Kidney function improved with IV hydration, though Cr still remains elevated at 1.40 on 6/7.  - Cr stable/improved slightly at 1.23 6/14.    - Trend BMP  - Avoid further IV dye for now    Hyponatremia   Severe. Initial sodium 109 with unintentional rapid correction to 123, D5W given with reduction to 120.  Managed by nephrology.  [place] likely related to polydipsia since urine osmolality was not elevated and ADH physiology.  Patient was also on sertraline as outpatient, which was discontinued due to possible contribution.  Sodium with ongoing improvement, stable at 138.  - Na stable 6/14 at 138  - Trend BMP  - Continue fluid restriction of 1.8 L; dietitian will provide education as he will go home on FR   - Per nephrology, avoid proton pump inhibitors, ACE inhibitors, thiazide diuretics, and SSRI medications for now.    Possible aspiration pneumonia  Acute respiratory failure with hypoxia, resolved  Required intubation for airway protection.  Extubated on 5/23 with no further airway concerns or hypoxia.  Productive cough initially.  Mild interstitial prominence of right lung base on x-ray.  Sputum culture with MRSA. Sensitivity noted.  Patient treated with IV vancomycin, with stop date of 5/31.  - Monitor respiratory status  - Encourage IS  - Supplemental oxygen PRN    Gastroesophageal reflux  1 episode of emesis night of 6/4 but seemed related to specific food.  Elected to defer GI consult with no clear indication for EGD.  Started on famotidine rather than PPI due to recent significant hyponatremia.  - Continue famotidine 20 mg daily.    Elevated BP  Patient normotensive at discharge to ARU.   - Remains stable, normotensive to soft at times  - Monitor BP    Nutrition  Dysphagia, resolved  Refused nasogastric  "tube 5/28.  SLP assessed 5/29 and recommended soft diet.  Discussed with speech therapy 6/2. Dysphagia correlates with declining mental status from encephalopathy.  Speech therapy recommended video swallow for 6/3 with speech to rule out silent aspiration. Results shows multiple episodes of penetration with thin liquid barium. No evidence of aspiration.  Diet upgraded to regular diet, thin liquids by speech therapy on 6/4.  - Continue SLP  - Continue regular diet with thin liquids     Hyperlipidemia  PTA simvastatin held in setting of elevated CK (4034 at admission, felt like secondary to seizures).  Per discharge summary, check CK in 2 days and resume simvastatin if normalized.  CK WNL, resumed.  - Continue simvastatin 20 mg at bedtime     Hx PTSD  Follows with psychology, [facility].  Started on sertraline 25 mg daily on 4/9/21.  This admission, PTA sertraline discontinued in setting of severe hyponatremia.  - Continue to hold sertraline  - Monitor for symptoms  - Follow up with outpatient psychiatry to consider alternative to sertraline for PTSD    Posterior neck pain  Present throughout acute hospitalization, per patient some chronic neck pain which he attributes to prior injury \"tubing\".  Denies radiation. Palpable tightness in right trapezius and levator muscles, palpation reproduces patient's pain.  Most likely myofascial pain with some facet mediated pain. No clinical s/s of cervical radiculopathy or myelopathy at this point. Alleviated with lidocaine patch.  - Continue lidocaine patch at night  - Tylenol PRN  - Aqua-k pad   - Discussed consideration for follow-up with PM&R as outpatient for injections if ongoing    Left calf pain  Reports new onset 6/9.  Denies injury of previous history of pain in this area.  No warmth, swelling.  Aggravated by weight-bearing.  Tender to palpation in mid/posterior calf, aggravated by dorsiflexion.  Suspect muscular, possible Achilles " tendonitis.  Improved with taping per PT.  - Continue stretching, taping per PT  - Trial ice or heat  - Continue Bengay  - Monitor      1. Adjustment to disability:  Clinical psychology to eval and treat as indicated  2. FEN: regular diet, thin liquids  3. Bowel: continent, monitor, PRN bowel meds available  4. Bladder: continent, PVRs at admission 21,0,17.  Ongoing monitoring PRN  5. DVT Prophylaxis: mechanical, ambulation  6. GI Prophylaxis: famotidine  7. Code: full  8. Disposition: home  9. ELOS: 7-10 days  10. Follow up Appointments on Discharge: PCP, psychiatry, neurology?, PM&R        Patient discussed with Dr. David Granados, PM&R staff physician     TAHIR Castle-C  Physical Medicine & Rehabilitation

## 2021-06-15 NOTE — PLAN OF CARE
FOCUS/GOAL  Nutrition/Feeding/Swallowing precautions, Medical management and Reinforcement of self-care/ADL    ASSESSMENT, INTERVENTIONS AND CONTINUING PLAN FOR GOAL:  Patient is alert/oriented x4, has been forgetful though to fluid restriction, asked NA for more water at noon, had already drank the 400 ml that had been allotted for this shift which had been explained this morning.  Reiterated again that would be able to refill after change of shift, patient had orange juice with breakfast as well.  Patient has been safe though in room with IND status.  Patient has soft BP but is asymptomatic. Patient did c/o neck pain this morning and requested PRN tylenol, stated this was helpful along with lidocaine patch applied in am.  No additional care concerns, continue with POC.

## 2021-06-15 NOTE — PLAN OF CARE
FOCUS/GOAL  Pain management and Medical management    ASSESSMENT, INTERVENTIONS AND CONTINUING PLAN FOR GOAL:  Patient is alert and oriented, but forgetful. Patient is now independent in the room with mobility. He is continent of bowel and bladder and reports a bowel movement today. He has constant, aching pain to his posterior neck that he manages with PRN Tylenol. He declined non-pharmaceutical techniques. Patient received Tylenol around 1600 and reported that it was somewhat effective. He requested another dose of PRN Tylenol at HS with medications. HS medications all given around 2000 per patient and family request. Patient remains on 1800cc fluid restriction, but is forgetful and does not keep track of his fluids. He did drink a 700cc water bottle today. Writer called nutrition and they had provided 440cc during the day. Patient did receive his full 1800cc by this evening. He ate 100% of his dinner. No other nursing concerns noted.

## 2021-06-15 NOTE — PLAN OF CARE
Discharge Planner Post-Acute Rehab SLP:      Discharge Plan: Home with spouse. Outpatient SLP.     Precautions: Fall     Current Status:  Communication: Mildly impaired word-finding at conversation level. Auditory comprehension, reading comprehension, verbal expression appear WFL.  Cognition: . Moderate cognitive-linguistic impairment.  Swallow: Regular and thin diet.      Assessment: Pt required moderate cues for divided attention task. He was able to identify and self correct in 25% of opportunities. Problem solving task completed with 100% accuracy however pt required mod-max cues for attention, problem solving/reasoning, and organization of information as well as attention to detail. Pt demonstrated improved reasoning skills with progression of the task     Other Barriers to Discharge (Family Training, etc): TBD

## 2021-06-15 NOTE — TELEPHONE ENCOUNTER
"PSP:  Dr. Chopra  Last clinic visit:  1/8/2021 RFA  Reason for call: Update  Clinical information:  Pt calling to give update on how he is doing after his RFA on 1/8/2021. He states he is doing well and the procedure went well. He states his back pain is now a 0-1/10. He states it occasionally flares up at times \"but not like it used too\". He reports 90+% relief.   Advice given to patient: Advised pt to follow-up as needed. Pt in agreement with this plan.   Provider to address: UZIEL    "

## 2021-06-15 NOTE — PLAN OF CARE
Discharge Planner Post-Acute Rehab OT:      Discharge Plan: Home with spouse, OP OT.      Precautions: Falls, cognitive impairment.     Current Status:  ADLs: I w/ tsfers and ambulation this date. Ok for mod I to I for bathroom needs.   IADLs: EFPT-see POC note 6/11.   Vision/Cognition: Corrective saccades with horizontal tracking. Scoring 18/30 on SLUMs, functionally demonstrating deficits with recall, problem solving, and insight. Pt completed 2 higher level tasks: bill identification and check writing w/ only 95% accuracy w/ cues 100% accuracy w/ additional time. Pt does need assistance w/ memory recall to locate items in kitchen. Will continue to benefit from cognitive retraining.      Assessment:  therapist faciliated similated laundry task including carrying basket, picking up/placing on ground, standing with folding and hanging up in closet. Pt completed w/supervision to assess balance with no LOB noted, and demo'd ability to recall 3 step directive after a delayed amount of time. Pt also engaged in task for locating and gathering information on moderately complex schedule.  Pt required Min cueing throughout, and extra time to locate and process items/information. Cont per POC.         Other Barriers to Discharge (DME, Family Training, etc):   DME/AE: shower chair, bathroom, grab bars   Caregiver training: recommended with spouse prior to discharge.

## 2021-06-15 NOTE — PLAN OF CARE
FOCUS/GOAL  Bowel management, Bladder management, and Pain management    ASSESSMENT, INTERVENTIONS AND CONTINUING PLAN FOR GOAL:      Pt slept well during the overnight. Denies pain. Independent in the room.  Continent of bladder and uses urinal . Continue with plan of care

## 2021-06-16 ENCOUNTER — APPOINTMENT (OUTPATIENT)
Dept: PHYSICAL THERAPY | Facility: CLINIC | Age: 56
End: 2021-06-16
Payer: COMMERCIAL

## 2021-06-16 ENCOUNTER — APPOINTMENT (OUTPATIENT)
Dept: EDUCATION SERVICES | Facility: CLINIC | Age: 56
End: 2021-06-16
Attending: PHYSICAL MEDICINE & REHABILITATION
Payer: COMMERCIAL

## 2021-06-16 ENCOUNTER — APPOINTMENT (OUTPATIENT)
Dept: OCCUPATIONAL THERAPY | Facility: CLINIC | Age: 56
End: 2021-06-16
Payer: COMMERCIAL

## 2021-06-16 ENCOUNTER — APPOINTMENT (OUTPATIENT)
Dept: SPEECH THERAPY | Facility: CLINIC | Age: 56
End: 2021-06-16
Payer: COMMERCIAL

## 2021-06-16 PROCEDURE — 97112 NEUROMUSCULAR REEDUCATION: CPT | Mod: GP

## 2021-06-16 PROCEDURE — 99232 SBSQ HOSP IP/OBS MODERATE 35: CPT | Performed by: PHYSICAL MEDICINE & REHABILITATION

## 2021-06-16 PROCEDURE — 97129 THER IVNTJ 1ST 15 MIN: CPT | Mod: GN | Performed by: SPEECH-LANGUAGE PATHOLOGIST

## 2021-06-16 PROCEDURE — 97535 SELF CARE MNGMENT TRAINING: CPT | Mod: GO | Performed by: OCCUPATIONAL THERAPIST

## 2021-06-16 PROCEDURE — 128N000003 HC R&B REHAB

## 2021-06-16 PROCEDURE — 97110 THERAPEUTIC EXERCISES: CPT | Mod: GO | Performed by: OCCUPATIONAL THERAPIST

## 2021-06-16 PROCEDURE — 250N000013 HC RX MED GY IP 250 OP 250 PS 637: Performed by: PHYSICIAN ASSISTANT

## 2021-06-16 PROCEDURE — 250N000013 HC RX MED GY IP 250 OP 250 PS 637: Performed by: PHYSICAL MEDICINE & REHABILITATION

## 2021-06-16 PROCEDURE — 97130 THER IVNTJ EA ADDL 15 MIN: CPT | Mod: GN | Performed by: SPEECH-LANGUAGE PATHOLOGIST

## 2021-06-16 RX ADMIN — SIMVASTATIN 20 MG: 20 TABLET, FILM COATED ORAL at 21:20

## 2021-06-16 RX ADMIN — Medication 5 MG: at 21:20

## 2021-06-16 RX ADMIN — LIDOCAINE 1 PATCH: 560 PATCH PERCUTANEOUS; TOPICAL; TRANSDERMAL at 08:12

## 2021-06-16 RX ADMIN — FAMOTIDINE 10 MG: 10 TABLET ORAL at 19:06

## 2021-06-16 RX ADMIN — FAMOTIDINE 10 MG: 10 TABLET ORAL at 08:12

## 2021-06-16 RX ADMIN — ASPIRIN 81 MG CHEWABLE TABLET 81 MG: 81 TABLET CHEWABLE at 08:12

## 2021-06-16 RX ADMIN — DIVALPROEX SODIUM 125 MG: 125 TABLET, DELAYED RELEASE ORAL at 21:20

## 2021-06-16 RX ADMIN — DOCUSATE SODIUM 50MG AND SENNOSIDES 8.6MG 1 TABLET: 8.6; 5 TABLET, FILM COATED ORAL at 22:11

## 2021-06-16 NOTE — PLAN OF CARE
Discharge Planner Post-Acute Rehab SLP:      Discharge Plan: Home with spouse. Outpatient SLP.     Precautions: Fall     Current Status:  Communication: Mildly impaired word-finding at conversation level. Auditory comprehension, reading comprehension, verbal expression appear WFL.  Cognition: . Moderate cognitive-linguistic impairment with short-term recall and executive function.  Swallow: Regular and thin diet.      Assessment: The patient demonstrated mildly impaired short-term recall and alternating attention during functional, work-related task. Patient benefits from auditory cues to improve short-term recall for task. Patient insight remains variable, question current vs prior cognitive function. Family training provided in afternoon with patient, spouse, and daughter present. Auditory and written education provided to patient and family regarding progress, strategies to implement upon return home (memory, organization, etc.), and plans for future sessions. Patient and family asked appropriate questions, verbalized understanding of education, and agreement with POC.    Other Barriers to Discharge (Family Training, etc): None from SLP perspective.

## 2021-06-16 NOTE — PROGRESS NOTES
"  [facility]   Acute Rehabilitation Unit  Daily progress note    INTERVAL HISTORY  Otoniel Gomez was seen and examined this morning.  No acute events reported overnight.  Denies chest pain, shortness of breath, no fever or chills.  Continues to make progress with therapy, on pace for discharge .    Functional:  PT:  Bed Mobility: IND  Transfer: IND without device  Gait: 500+ ft IND without device. Occasional L/R sway but does well with appropriate stepping, ankle strategies to maintain upright, including on uneven surfaces  Stairs: 12x6\" steps with R rail, reciprocal, SBA  Balance: A of -  TU.7 sec without device  30\" sit<>stand: 13x without UE A  MURRAY/56  FGA:        ROS: 10 point ROS neg other than the symptoms noted above in the HPI.        MEDICATIONS    aspirin  81 mg Oral Daily     divalproex sodium delayed-release  125 mg Oral At Bedtime     famotidine  10 mg Oral BID     lidocaine  1 patch Transdermal Q24h    And     lidocaine   Transdermal Q8H     melatonin  5 mg Oral At Bedtime     simvastatin  20 mg Oral At Bedtime        acetaminophen, menthol (Topical Analgesic) 2.5%, polyethylene glycol, senna-docusate     PHYSICAL EXAM  /72 (BP Location: Right arm)   Pulse 76   Temp 95.7  F (35.4  C) (Oral)   Resp 16   Ht 1.676 m (5' 6\")   Wt 68 kg (149 lb 14.4 oz)   SpO2 100%   BMI 24.19 kg/m      Gen: NAD, resting in bed   HEENT: well-healed scar right parietal region, lidocaine on right posterior neck  CVS: RRR, no murmurs  Pulm: non-labored on room air, lungs CTA bilaterally  Abd: soft, non-tender, non-distended, bowel sounds present  Ext: no edema or calf tenderness in bilateral lower extremities    Neuro/MSK: alert, responds appropriately though slowed, follows basic commands     LABS  CBC RESULTS:   Recent Labs   Lab Test 21  0742 21  0759 06/10/21  0653   WBC 4.9 6.2 5.9   RBC 3.39* 3.49* 3.21*   HGB 9.7* 9.8* 9.0*   HCT 30.0* " 31.5* 27.5*   MCV 89 90 86   MCH 28.6 28.1 28.0   MCHC 32.3 31.1* 32.7   RDW 14.4 14.6 14.5    273 233     Last Basic Metabolic Panel:  Recent Labs   Lab Test 06/14/21  0742 06/11/21  0759 06/10/21  0653    135 133   POTASSIUM 4.5 4.8 4.6   CHLORIDE 103 102 99   CO2 29 25 28   ANIONGAP 6 8 6   GLC 84 86 84   BUN 29 32* 27   CR 1.23 1.40* 1.31*   GFRESTIMATED 66 56* 61   JULIO 8.5 9.1 9.0     ASSESSMENT AND PLAN  Otoniel Gomez is a 55 year old male with past medical history of 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  He is admitted to ARU on 6/8/21 for rehabilitation and ongoing medical management.       Rehabilitation - continue comprehensive acute inpatient rehabilitation program with multidisciplinary approach including therapies, rehab nursing, and physiatry following. See interval history for updates.     Medical issues   Seizures  Received valium per EMS, ongoing seizures.  Loaded with Keppra, benzodiazepine in ED. Neurology consulted.  Likely precipitated by severe hyponatremia.  Required intubation for airway protection.  EEG consistent with severe encephalopathy consistent with postictal state, sedation, hyponatremia, but no seizure activity observed.  Per neuro, likely will have prolonged recovery time given history of brain tumor and numerous chronic microhemorrhages, large amount of sedation that was initially used, and recovering hyponatremia.  Seizures resolved with stabilization of sodium levels.  Neurology did not feel AEDs indicated given underlying cause (hyponatremia) treated.  - Seizure  precautions     Incidental CVA  Noted on brain MRI 5/22 with 2 small infarcts in R temporal lobe.  Neurology following patient and felt these areas were incidental findings, unrelated to his current presentation.  Repeat brain MRI 5/27 with interval increase in size of 2 areas of acute infarct in R temporal lobe.  - Continue PTA ASA 81 mg  - Continue PT/OT/SLP    Acute delirium/encephalopathy   Noted beginning evening of 5/29.  Associated with confusion, agitation, visual hallucinations, combativeness, impulsive, pulling at lines, devices and threatening to hit at staff.  Felt to be likely in the setting of underlying brain pathology from medulloblastoma, significant hyponatremia early hospital course, coexisting temporal stroke.  Transferred to ICU and received Precedex infusion.  Psychiatry consulted, started on scheduled Seroquel and IV valproic acid.  Seroquel has since been weaned down to as needed and delayed release oral Depakote started at bedtime, at 250 mg at ARU admission.  No ongoing significant agitation since 6/2.  Elected to defer repeat brain MRI due to contrast (JACK) and mental status now returned to baseline.  Ongoing wean of Depakote, with decrease to 125 mg at HS on 6/14.  - Monitor for recurrence  - Continue Depakote 125 mg delayed-release tablet at bedtime.  Wean as able.  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  Cr jumped from 0.88 to peak of 1.55, down-trending since.  Also suspect possible contribution of reduced oral fluid intake, NPO due to dysphagia on 5/31.  Kidney function improved with IV hydration, though Cr still remains elevated at 1.40 on 6/7.  -  Cr stable/improved slightly at 1.23 6/14.    - Trend BMP  - Avoid further IV dye for now    Hyponatremia   Severe. Initial sodium 109 with unintentional rapid correction to 123, D5W given with reduction to 120.  Managed by nephrology.  [place] likely related to polydipsia since urine osmolality was not elevated and ADH physiology.  Patient was also on sertraline as outpatient, which was discontinued due to possible contribution.  Sodium with ongoing improvement, stable at 138.  - Na stable 6/14 at 138  - Trend BMP  - Continue fluid restriction of 1.8 L; dietitian will provide education as he will go home on FR   - Per nephrology, avoid proton pump inhibitors, ACE inhibitors, thiazide diuretics, and SSRI medications for now.    Possible aspiration pneumonia  Acute respiratory failure with hypoxia, resolved  Required intubation for airway protection.  Extubated on 5/23 with no further airway concerns or hypoxia.  Productive cough initially.  Mild interstitial prominence of right lung base on x-ray.  Sputum culture with MRSA. Sensitivity noted.  Patient treated with IV vancomycin, with stop date of 5/31.  - Monitor respiratory status  - Encourage IS  - Supplemental oxygen PRN    Gastroesophageal reflux  1 episode of emesis night of 6/4 but seemed related to specific food.  Elected to defer GI consult with no clear indication for EGD.  Started on famotidine rather than PPI due to recent significant hyponatremia.  - Continue famotidine 20 mg daily.    Elevated BP  Patient normotensive at discharge to ARU.   - Remains stable, normotensive to soft at times  - Monitor BP    Nutrition  Dysphagia, resolved  Refused nasogastric tube 5/28.  SLP assessed 5/29 and recommended soft diet.  Discussed with speech therapy 6/2. Dysphagia correlates with declining mental status from encephalopathy.  Speech therapy recommended video swallow for 6/3 with speech to rule out silent aspiration. Results shows multiple episodes of penetration  "with thin liquid barium. No evidence of aspiration.  Diet upgraded to regular diet, thin liquids by speech therapy on 6/4.  - Continue SLP  - Continue regular diet with thin liquids     Hyperlipidemia  PTA simvastatin held in setting of elevated CK (4034 at admission, felt like secondary to seizures).  Per discharge summary, check CK in 2 days and resume simvastatin if normalized.  CK WNL, resumed.  - Continue simvastatin 20 mg at bedtime     Hx PTSD  Follows with psychology, psychiatry as outpatient at [facility].  Started on sertraline 25 mg daily on 4/9/21.  This admission, PTA sertraline discontinued in setting of severe hyponatremia.  - Continue to hold sertraline  - Monitor for symptoms  - Follow up with outpatient psychiatry to consider alternative to sertraline for PTSD    Posterior neck pain  Present throughout acute hospitalization, per patient some chronic neck pain which he attributes to prior injury \"tubing\".  Denies radiation. Palpable tightness in right trapezius and levator muscles, palpation reproduces patient's pain.  Most likely myofascial pain with some facet mediated pain. No clinical s/s of cervical radiculopathy or myelopathy at this point. Alleviated with lidocaine patch.  - Continue lidocaine patch at night  - Tylenol PRN  - Aqua-k pad   - Discussed consideration for follow-up with PM&R as outpatient for injections if ongoing    Left calf pain  Reports new onset 6/9.  Denies injury of previous history of pain in this area.  No warmth, swelling.  Aggravated by weight-bearing.  Tender to palpation in mid/posterior calf, aggravated by dorsiflexion.  Suspect muscular, possible Achilles tendonitis.  Improved with taping per PT.  - Continue stretching, taping per PT  - Trial ice or heat  - Continue [place]  - Monitor      1. Adjustment to disability:  Clinical psychology to eval and treat as indicated  2. FEN: regular diet, thin liquids  3. Bowel: continent, monitor, PRN bowel meds " available  4. Bladder: continent, PVRs at admission 21,0,17.  Ongoing monitoring PRN  5. DVT Prophylaxis: mechanical, ambulation  6. GI Prophylaxis: famotidine  7. Code: full  8. Disposition: home  9. ELOS: 7-10 days  10. Follow up Appointments on Discharge: PCP, psychiatry, neurology?, PM&R        David Granados, DO  Physical Medicine & Rehabilitation      I spent a total of 25 minutes face to face and coordinating care of Otoniel Gomez. Over 50% of my time on the unit was spent counseling the patient and /or coordinating care regarding metabolic encephalopathy 2/2 hyponatremia and new CVA.

## 2021-06-16 NOTE — PLAN OF CARE
"Discharge Planner Post-Acute Rehab PT:      Discharge Plan: Home with spouse, 5 COMPA with R rail. OP PT.     Precautions: Falls, Seizure     Current Status:  Bed Mobility: IND  Transfer: IND without device  Gait: 500+ ft IND without device. Occasional L/R sway but does well with appropriate stepping, ankle strategies to maintain upright, including on uneven surfaces  Stairs: 12x6\" steps with R rail, reciprocal, SBA  Balance: A of -  TU.7 sec without device  30\" sit<>stand: 13x without UE A  MURRAY/56  FGA:      Assessment:  Pt's family present in pt's room, reports that family training went well yesterday, no further questions or concerns. Pt ambulated outdoors on various surfaces with SBA/CGA depending on surface. Pt would like HEP handouts for LBP prior to discharge.      Other Barriers to Discharge (DME, Family Training, etc): Balance, OCM impairments from previous cerebellar resection; Cognition  "

## 2021-06-16 NOTE — PLAN OF CARE
Alert and oriented, forgetful at times. . Able to make needs known. Call light in reach. Offers no C/O pain/discomfort. Up ad shorty, independent in room, no device. Continent of bowel and bladder. Last BM on 6/15. Appears to be sleeping during rounds. No issues or concerns overnight. Contact isolation precautions maintained.  Continue with plan of care.

## 2021-06-16 NOTE — PLAN OF CARE
Discharge Planner Post-Acute Rehab OT:      Discharge Plan: Home with spouse, OP OT.      Precautions: Falls, cognitive impairment.     Current Status:  ADLs: IND transfers/mobility. IND ADLs.   IADLs: SBA simple IADLs. Recommend assist more complex IADLs due to cognitive deficits.  Vision/Cognition:  See EFPT (6/11). Functionally demonstrating deficits with recall, problem solving, and insight.      Assessment: Completed stroke transitions class in am session, pt very receptive of information and recommendations. Completed caregiver training with pt spouse and daughter in pm session. Reviewed recommendations for ADL/IADL support, DME/AE needs, cognitive performance during functional tasks, community reintegration, and role of OP OT. On track to discharge home 6/18.     Other Barriers to Discharge (DME, Family Training, etc):   DME/AE: shower chair, bathroom, grab bars   Caregiver training: Completed 6/16 2-4p.

## 2021-06-16 NOTE — PLAN OF CARE
FOCUS/GOAL  Medical management and Mobility    ASSESSMENT, INTERVENTIONS AND CONTINUING PLAN FOR GOAL:  Patient is alert/oriented x4, still forgetful at times with details/new learning, denies any HA or neck pain this am, Lidocaine patch applied to posterior neck.  Patient has been safe with ind status in room without assistive device. Needs reminders for the fluid restriction, had the 400ml of water and 200 with breakfast, patient's family at bedside this afternoon for stroke class brought in lunch so will need check in on how much fluid had with family, Sodium will be rechecked in the am. Continue to educate on monitoring of fluid intake. BP is soft again this am but patient asymptomatic with lower BP.

## 2021-06-16 NOTE — CONSULTS
Stroke Education Note    The following information has been reviewed with the patient and family:    1. Warning signs of stroke    2. Calling 911 if having warning signs of stroke    3. All modifiable risk factors: hypertension, CAD, atrial fib, diabetes, hypercholesterolemia, smoking, substance abuse, diet, physical inactivity, obesity, sleep apnea    4. Patient's risk factors for stroke which include: HLD    5. Follow-up plan for after discharge    6. Discharge medications which include: ASA, Simvastatin    In addition, the above information was given to the patient and family in writing as a part of the Understanding Stroke Handbook.     Learner's response to risk factors / lifestyle modification education: Desire to change Activation Taking steps     Afshan Edmond RN

## 2021-06-16 NOTE — PLAN OF CARE
"FOCUS/GOAL  Medication management, Pain management, Medical management, Reinforcement of self-care/ADL, and Safety management    ASSESSMENT, INTERVENTIONS AND CONTINUING PLAN FOR GOAL:  /60 (BP Location: Right arm)   Pulse 89   Temp 95.5  F (35.3  C) (Oral)   Resp 16   Ht 1.676 m (5' 6\")   Wt 68 kg (149 lb 14.4 oz)   SpO2 98%   BMI 24.19 kg/m    Pt vitals stable. Denies pain, SOB or cough. LS. Pt is alert and oriented x4. Able to make needs known and using call light appropriately. Independent in room without a device. Continent of bowel and bladder, LBM 6/15. On regular diet/thin liquids and takes pills whole without difficulties. On fluid restriction 1800 ML.   Covid swab test completed results negative.     "

## 2021-06-17 ENCOUNTER — APPOINTMENT (OUTPATIENT)
Dept: SPEECH THERAPY | Facility: CLINIC | Age: 56
End: 2021-06-17
Payer: COMMERCIAL

## 2021-06-17 ENCOUNTER — APPOINTMENT (OUTPATIENT)
Dept: OCCUPATIONAL THERAPY | Facility: CLINIC | Age: 56
End: 2021-06-17
Payer: COMMERCIAL

## 2021-06-17 ENCOUNTER — APPOINTMENT (OUTPATIENT)
Dept: PHYSICAL THERAPY | Facility: CLINIC | Age: 56
End: 2021-06-17
Payer: COMMERCIAL

## 2021-06-17 LAB
ANION GAP SERPL CALCULATED.3IONS-SCNC: 4 MMOL/L (ref 3–14)
BUN SERPL-MCNC: 27 MG/DL (ref 7–30)
CALCIUM SERPL-MCNC: 8.7 MG/DL (ref 8.5–10.1)
CHLORIDE SERPL-SCNC: 103 MMOL/L (ref 94–109)
CO2 SERPL-SCNC: 29 MMOL/L (ref 20–32)
CREAT SERPL-MCNC: 1.26 MG/DL (ref 0.66–1.25)
ERYTHROCYTE [DISTWIDTH] IN BLOOD BY AUTOMATED COUNT: 14.3 % (ref 10–15)
GFR SERPL CREATININE-BSD FRML MDRD: 64 ML/MIN/{1.73_M2}
GLUCOSE SERPL-MCNC: 88 MG/DL (ref 70–99)
HCT VFR BLD AUTO: 30.3 % (ref 40–53)
HGB BLD-MCNC: 9.7 G/DL (ref 13.3–17.7)
MCH RBC QN AUTO: 28.4 PG (ref 26.5–33)
MCHC RBC AUTO-ENTMCNC: 32 G/DL (ref 31.5–36.5)
MCV RBC AUTO: 89 FL (ref 78–100)
PLATELET # BLD AUTO: 315 10E9/L (ref 150–450)
POTASSIUM SERPL-SCNC: 4.4 MMOL/L (ref 3.4–5.3)
RBC # BLD AUTO: 3.41 10E12/L (ref 4.4–5.9)
SODIUM SERPL-SCNC: 136 MMOL/L (ref 133–144)
WBC # BLD AUTO: 6.3 10E9/L (ref 4–11)

## 2021-06-17 PROCEDURE — 97129 THER IVNTJ 1ST 15 MIN: CPT | Mod: GN | Performed by: SPEECH-LANGUAGE PATHOLOGIST

## 2021-06-17 PROCEDURE — 80048 BASIC METABOLIC PNL TOTAL CA: CPT | Performed by: PHYSICIAN ASSISTANT

## 2021-06-17 PROCEDURE — 97750 PHYSICAL PERFORMANCE TEST: CPT | Mod: GP | Performed by: PHYSICAL THERAPIST

## 2021-06-17 PROCEDURE — 97110 THERAPEUTIC EXERCISES: CPT | Mod: GO | Performed by: OCCUPATIONAL THERAPIST

## 2021-06-17 PROCEDURE — 36415 COLL VENOUS BLD VENIPUNCTURE: CPT | Performed by: PHYSICIAN ASSISTANT

## 2021-06-17 PROCEDURE — 250N000013 HC RX MED GY IP 250 OP 250 PS 637: Performed by: PHYSICAL MEDICINE & REHABILITATION

## 2021-06-17 PROCEDURE — 250N000013 HC RX MED GY IP 250 OP 250 PS 637: Performed by: PHYSICIAN ASSISTANT

## 2021-06-17 PROCEDURE — 97535 SELF CARE MNGMENT TRAINING: CPT | Mod: GO | Performed by: OCCUPATIONAL THERAPIST

## 2021-06-17 PROCEDURE — 97530 THERAPEUTIC ACTIVITIES: CPT | Mod: GO

## 2021-06-17 PROCEDURE — 999N000150 HC STATISTIC PT MED CONFERENCE < 30 MIN: Performed by: PHYSICAL THERAPIST

## 2021-06-17 PROCEDURE — 97530 THERAPEUTIC ACTIVITIES: CPT | Mod: GP | Performed by: PHYSICAL THERAPIST

## 2021-06-17 PROCEDURE — 999N000125 HC STATISTIC PATIENT MED CONFERENCE < 30 MIN: Performed by: OCCUPATIONAL THERAPIST

## 2021-06-17 PROCEDURE — 128N000003 HC R&B REHAB

## 2021-06-17 PROCEDURE — 97130 THER IVNTJ EA ADDL 15 MIN: CPT | Mod: GN | Performed by: SPEECH-LANGUAGE PATHOLOGIST

## 2021-06-17 PROCEDURE — 999N000125 HC STATISTIC PATIENT MED CONFERENCE < 30 MIN: Performed by: SPEECH-LANGUAGE PATHOLOGIST

## 2021-06-17 PROCEDURE — 97535 SELF CARE MNGMENT TRAINING: CPT | Mod: GO

## 2021-06-17 PROCEDURE — 99233 SBSQ HOSP IP/OBS HIGH 50: CPT | Performed by: PHYSICAL MEDICINE & REHABILITATION

## 2021-06-17 PROCEDURE — 85027 COMPLETE CBC AUTOMATED: CPT | Performed by: PHYSICIAN ASSISTANT

## 2021-06-17 RX ORDER — AMOXICILLIN 250 MG
1 CAPSULE ORAL 2 TIMES DAILY PRN
Qty: 10 TABLET | Refills: 0 | Status: SHIPPED | OUTPATIENT
Start: 2021-06-17

## 2021-06-17 RX ORDER — FAMOTIDINE 10 MG
10 TABLET ORAL 2 TIMES DAILY
Qty: 60 TABLET | Refills: 0 | Status: SHIPPED | OUTPATIENT
Start: 2021-06-17

## 2021-06-17 RX ORDER — ACETAMINOPHEN 325 MG/1
325-650 TABLET ORAL EVERY 4 HOURS PRN
COMMUNITY
Start: 2021-06-17

## 2021-06-17 RX ORDER — ASPIRIN 81 MG/1
81 TABLET, CHEWABLE ORAL DAILY
Qty: 30 TABLET | Refills: 0 | Status: SHIPPED | OUTPATIENT
Start: 2021-06-18

## 2021-06-17 RX ORDER — SIMVASTATIN 20 MG
20 TABLET ORAL AT BEDTIME
Qty: 30 TABLET | Refills: 0 | Status: SHIPPED | OUTPATIENT
Start: 2021-06-17

## 2021-06-17 RX ORDER — LIDOCAINE 4 G/G
1 PATCH TOPICAL EVERY 24 HOURS
Qty: 30 PATCH | Refills: 0 | Status: SHIPPED | OUTPATIENT
Start: 2021-06-17

## 2021-06-17 RX ADMIN — Medication 5 MG: at 20:27

## 2021-06-17 RX ADMIN — FAMOTIDINE 10 MG: 10 TABLET ORAL at 08:07

## 2021-06-17 RX ADMIN — FAMOTIDINE 10 MG: 10 TABLET ORAL at 20:27

## 2021-06-17 RX ADMIN — SIMVASTATIN 20 MG: 20 TABLET, FILM COATED ORAL at 20:27

## 2021-06-17 RX ADMIN — LIDOCAINE 1 PATCH: 560 PATCH PERCUTANEOUS; TOPICAL; TRANSDERMAL at 08:07

## 2021-06-17 RX ADMIN — ASPIRIN 81 MG CHEWABLE TABLET 81 MG: 81 TABLET CHEWABLE at 08:07

## 2021-06-17 NOTE — PROGRESS NOTES
CLINICAL NUTRITION SERVICES - BRIEF ASSESSMENT/EDUCATION NOTE     Nutrition Prescription    RECOMMENDATIONS FOR MDs/PROVIDERS TO ORDER:  None today - pt reviewed face to face during team rounds     Malnutrition Status:    Patient does not meet two of the established criteria necessary for diagnosing malnutrition    Recommendations already ordered by Registered Dietitian (RD):  None - fluid restriction education and handout provided     Future/Additional Recommendations:  None - pt is planned for discharge tomorrow      REASON FOR BRIEF ASSESSMENT/EDUCATION  Otoniel Gomez is a/an 55 year old male assessed by the dietitian for LOS/education regarding fluid restriction for home     FINDINGS  Per chart review: Pt admits to ARU for rehabilitation in the setting of post hospitalization for seizures, severe hyponatremia requiring intubation for airway protection 5/21-5/23, with course complicated by R temporal lobe infarcts on imaging, JACK, significant encephalopathy and agitation, possible aspiration pneumonia, GERD, and dysphagia. Past medical history noted for malignant cerebellar medulloblastoma s/p craniotomy, tumor resection, and chemotherapy in 2007, asthma, and hyperlipidemia.     Pt was reviewed during team rounds during admission with plan for pt to likely discharge on fluid restriction at home. During rounds today, Providers confirmed recommendation for 1800 ml FR upon discharge, RD visited pt/family during rounds and reviewed recommendations and tips for following FR at home, provided family with handouts. Pt is planned for discharge tomorrow.     CURRENT NUTRITION ORDERS  Diet: 1800 mL Fluid Restriction and Regular  Intake/Tolerance: 100% per flow sheets     LABS  Per Provider notes, pt admitted to hospital with severe hyponatremia at 109     6/9/2021 05:33 6/10/2021 06:53 6/11/2021 07:59 6/14/2021 07:42 6/17/2021 06:33   Sodium 132 (L) 133 135 138 136     MEDICATIONS  Medications  "reviewed    ANTHROPOMETRICS  Height: 167.6 cm (5' 6\")  Most Recent Weight: 68 kg (149 lb 14.4 oz)    IBW: 64.5 kg  BMI: Normal BMI  Weight History:   Wt Readings from Last 20 Encounters:   06/15/21 68 kg (149 lb 14.4 oz)   09/06/17 65.8 kg (145 lb)   09/06/17 65.8 kg (145 lb)   04/22/15 65.8 kg (145 lb)     Dosing Weight: 68 kg    ASSESSED NUTRITION NEEDS  Estimated Energy Needs: 2628-8539 kcals/day (25 - 30 kcals/kg)  Justification: Maintenance  Estimated Protein Needs: 70 grams protein/day (1 grams of pro/kg)  Justification: Maintenance  Estimated Fluid Needs: 1800 mL/day  Justification: On a fluid restriction    MALNUTRITION  % Intake: No decreased intake noted  % Weight Loss: None noted  Subcutaneous Fat Loss: None observed  Muscle Loss: None observed  Fluid Accumulation/Edema: None noted  Malnutrition Diagnosis: Patient does not meet two of the established criteria necessary for diagnosing malnutrition    NUTRITION DIAGNOSIS  Food- and nutrition-related knowledge deficit related to therapeutic recommendations as evidenced by no prior knowledge       INTERVENTIONS  Implementation  Nutrition Education: Provided education on fluid restriction for home, handouts provided.     Goals  Patient/family will verbalize understanding of diet recommendations - met.      Monitoring/Evaluation  No nutrition follow-up warranted at this time as pt is planned for discharge tomorrow. RD to sign off. Please consult if further needs arise.     Meg Lauren RD, CNSC, LD  ARU RD pager: [phone]  "

## 2021-06-17 NOTE — PROGRESS NOTES
"  [facility]   Acute Rehabilitation Unit  Daily progress note    INTERVAL HISTORY  Otoniel Gomez was seen and examined this morning during team rounds.  Wife and daughter present at bedside.  No acute events reported overnight.  He reports to be feeling well, some ongoing low back and neck pain.  Denies shortness of breath, dizziness, nausea, bowel or bladder concerns.  AM labs with stable anemia, stable Cr, Na stalbe WNL. Recommend ongoing fluid restriction at discharge, RD provided education to family and patient.    Functionally, he has made good progress with therapies and is mod I in his room without assistive device.  On track for discharge tomorrow with ongoing outpatient therapies.  For full functional updates, see team rounds note from today.      MEDICATIONS    aspirin  81 mg Oral Daily     divalproex sodium delayed-release  125 mg Oral At Bedtime     famotidine  10 mg Oral BID     lidocaine  1 patch Transdermal Q24h    And     lidocaine   Transdermal Q8H     melatonin  5 mg Oral At Bedtime     simvastatin  20 mg Oral At Bedtime        acetaminophen, menthol (Topical Analgesic) 2.5%, polyethylene glycol, senna-docusate     PHYSICAL EXAM  /68 (BP Location: Right arm)   Pulse 84   Temp 96.7  F (35.9  C) (Oral)   Resp 18   Ht 1.676 m (5' 6\")   Wt 68 kg (149 lb 14.4 oz)   SpO2 95%   BMI 24.19 kg/m      Gen: NAD, resting in bed   HEENT: well-healed scar right parietal region, lidocaine on right posterior neck  CVS: RRR  Pulm: non-labored on room air  Abd: soft, non-tender, non-distended  Ext: no edema or calf tenderness in bilateral lower extremities    Neuro/MSK: alert, responds appropriately though slowed, follows basic commands     LABS  CBC RESULTS:   Recent Labs   Lab Test 06/17/21  0633 06/14/21  0742 06/11/21  0759   WBC 6.3 4.9 6.2   RBC 3.41* 3.39* 3.49*   HGB 9.7* 9.7* 9.8*   HCT 30.3* 30.0* 31.5*   MCV 89 89 90   MCH 28.4 28.6 28.1   MCHC 32.0 32.3 " 31.1*   RDW 14.3 14.4 14.6    302 273     Last Basic Metabolic Panel:  Recent Labs   Lab Test 06/17/21  0633 06/14/21  0742 06/11/21  0759    138 135   POTASSIUM 4.4 4.5 4.8   CHLORIDE 103 103 102   CO2 29 29 25   ANIONGAP 4 6 8   GLC 88 84 86   BUN 27 29 32*   CR 1.26* 1.23 1.40*   GFRESTIMATED 64 66 56*   JULIO 8.7 8.5 9.1     ASSESSMENT AND PLAN  Otoniel Gomez is a 55 year old male with past medical history of 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  He is admitted to ARU on 6/8/21 for rehabilitation and ongoing medical management.       Rehabilitation - continue comprehensive acute inpatient rehabilitation program with multidisciplinary approach including therapies, rehab nursing, and physiatry following. See interval history for updates.     Medical issues   Seizures  Received valium per EMS, ongoing seizures.  Loaded with Keppra, benzodiazepine in ED. Neurology consulted.  Likely precipitated by severe hyponatremia.  Required intubation for airway protection.  EEG consistent with severe encephalopathy consistent with postictal state, sedation, hyponatremia, but no seizure activity observed.  Per neuro, likely will have prolonged recovery time given history of brain tumor and numerous chronic microhemorrhages, large amount of sedation that was initially used, and recovering hyponatremia.  Seizures resolved with stabilization of sodium levels.  Neurology did not feel AEDs indicated given underlying cause (hyponatremia) treated.  - Seizure precautions     Incidental CVA  Noted on brain MRI 5/22 with 2 small infarcts in R  temporal lobe.  Neurology following patient and felt these areas were incidental findings, unrelated to his current presentation.  Repeat brain MRI 5/27 with interval increase in size of 2 areas of acute infarct in R temporal lobe.  - Continue PTA ASA 81 mg  - Continue PT/OT/SLP    Acute delirium/encephalopathy   Noted beginning evening of 5/29.  Associated with confusion, agitation, visual hallucinations, combativeness, impulsive, pulling at lines, devices and threatening to hit at staff.  Felt to be likely in the setting of underlying brain pathology from medulloblastoma, significant hyponatremia early hospital course, coexisting temporal stroke.  Transferred to ICU and received Precedex infusion.  Psychiatry consulted, started on scheduled Seroquel and IV valproic acid.  Seroquel has since been weaned down to as needed and delayed release oral Depakote started at bedtime, at 250 mg at ARU admission.  No ongoing significant agitation since 6/2.  Elected to defer repeat brain MRI due to contrast (JACK) and mental status now returned to baseline.  Ongoing wean of Depakote, with decrease to 125 mg at HS on 6/14.  - Monitor for recurrence  - Discussed with pharmacist, will discontinue Depakote  - Continue melatonin 5 mg at bedtime  - Continue PT/OT/SLP     Hx Cerebellar Medulloblastoma s/p surgical resection and chemotherapy 2007  - Brain imaging with post-op changes, no evidence of recurrent mass    Acute kidney injury  Developed JACK, felt likely contrast nephropathy given timing ~3 days after MRI brain with contrast.  Cr jumped from 0.88 to peak of 1.55, down-trending since.  Also suspect possible contribution of reduced oral fluid intake, NPO due to dysphagia on 5/31.  Kidney function improved with IV hydration, though Cr still remains elevated at 1.40 on 6/7.  - Cr stable at 1.26 today    - Trend BMP  - Avoid further IV dye for now    Hyponatremia   Severe. Initial sodium 109 with unintentional rapid correction to  123, D5W given with reduction to 120.  Managed by nephrology.  Drumore likely related to polydipsia since urine osmolality was not elevated and ADH physiology.  Patient was also on sertraline as outpatient, which was discontinued due to possible contribution.  Sodium with ongoing improvement, stable at 138.  - Na stable 6/17 at 136  - Trend BMP  - Continue fluid restriction of 1.8 L; dietitian will provide education as he will go home on FR   - Per nephrology, avoid proton pump inhibitors, ACE inhibitors, thiazide diuretics, and SSRI medications for now.    Possible aspiration pneumonia  Acute respiratory failure with hypoxia, resolved  Required intubation for airway protection.  Extubated on 5/23 with no further airway concerns or hypoxia.  Productive cough initially.  Mild interstitial prominence of right lung base on x-ray.  Sputum culture with MRSA. Sensitivity noted.  Patient treated with IV vancomycin, with stop date of 5/31.  - Monitor respiratory status  - Encourage IS  - Supplemental oxygen PRN    Gastroesophageal reflux  1 episode of emesis night of 6/4 but seemed related to specific food.  Elected to defer GI consult with no clear indication for EGD.  Started on famotidine rather than PPI due to recent significant hyponatremia.  - Continue famotidine 20 mg daily.    Elevated BP  Patient normotensive at discharge to ARU.   - Remains stable, normotensive to soft at times  - Monitor BP    Nutrition  Dysphagia, resolved  Refused nasogastric tube 5/28.  SLP assessed 5/29 and recommended soft diet.  Discussed with speech therapy 6/2. Dysphagia correlates with declining mental status from encephalopathy.  Speech therapy recommended video swallow for 6/3 with speech to rule out silent aspiration. Results shows multiple episodes of penetration with thin liquid barium. No evidence of aspiration.  Diet upgraded to regular diet, thin liquids by speech therapy on 6/4.  - Continue SLP  - Continue regular diet with thin  "liquids     Hyperlipidemia  PTA simvastatin held in setting of elevated CK (4034 at admission, felt like secondary to seizures).  Per discharge summary, check CK in 2 days and resume simvastatin if normalized.  CK WNL, resumed.  - Continue simvastatin 20 mg at bedtime     Hx PTSD  Follows with psychology, psychiatry as [facility].  Started on sertraline 25 mg daily on 4/9/21.  This admission, PTA sertraline discontinued in setting of severe hyponatremia.  - Continue to hold sertraline  - Monitor for symptoms  - Follow up with outpatient psychiatry to consider alternative to sertraline for PTSD    Posterior neck pain  Present throughout acute hospitalization, per patient some chronic neck pain which he attributes to prior injury \"tubing\".  Denies radiation. Palpable tightness in right trapezius and levator muscles, palpation reproduces patient's pain.  Most likely myofascial pain with some facet mediated pain. No clinical s/s of cervical radiculopathy or myelopathy at this point. Alleviated with lidocaine patch.  - Continue lidocaine patch at night  - Tylenol PRN  - Aqua-k pad   - PT provided home exercises to target neck and back pain  - Discussed consideration for follow-up with PM&R as outpatient for injections if ongoing    Left calf pain, improved  Reports new onset 6/9.  Denies injury of previous history of pain in this area.  No warmth, swelling.  Aggravated by weight-bearing.  Tender to palpation in mid/posterior calf, aggravated by dorsiflexion.  Suspect muscular, possible Achilles tendonitis.  Improved with taping per PT.  - Continue stretching, taping per PT  - Trial ice or heat  - Continue Beng[place]  - Monitor      1. Adjustment to disability:  Clinical psychology to eval and treat as indicated  2. FEN: regular diet, thin liquids  3. Bowel: continent, monitor, PRN bowel meds available  4. Bladder: continent, PVRs at admission 21,0,17.  Ongoing monitoring PRN  5. DVT Prophylaxis: " mechanical, ambulation  6. GI Prophylaxis: famotidine  7. Code: full  8. Disposition: home with outpatient PT, OT, SLP  9. ELOS: 7-10 days, on track for discharge tomorrow (6/18)  10. Follow up Appointments on Discharge: PCP, psychiatry, neurology?, PM&R      Seen and discussed with Dr. David Granados, PM&R staff physician     Lorenza Draper PA-C  Physical Medicine & Rehabilitation

## 2021-06-17 NOTE — DISCHARGE SUMMARY
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  He was admitted to ARU on 6/8/21 for rehabilitation and ongoing medical management.     He remained medically stable throughout his rehab stay, with ongoing monitoring of his sodium, kidney function, respiratory and hydration status remaining stable.  No recurrent seizure activity observed.  He made notable gains with therapy and remains most limited by ongoing cognitive deficits, though no further agitation and thus able to be weaned off Depakote.  He also has some ongoing impairments in balance though safely ambulating without assistive device. Family training was completed by therapists, and patient is now ready to be discharged home with recommendation of 24/7 supervision from family initially and ongoing outpatient PT, OT, and SLP.  Sodium and kidney function are stable at discharge, patient and family educated on ongoing fluid restriction and will plan to follow up with PCP  "for ongoing monitoring and recommendations.    REHABILITATION COURSE  PT: Pt has met all goals for IND mobility without assistive device. Assist from family for higher level cognitive tasks.  Progress towards therapy goal(s): Goals met  Balance Tests on 21:  TU.5 sec without device  30\" sit<>stand: 17x without UE   MURRAY/56  Functional Gait Assessment:   Therapy recommendation(s):  Pt to transition to OP PT ([facility]) for continued balance impairments (notes to be approx. 80-90% of baseline).  Pt has been issued HEP for standing balance and LE coordination (handout), as well as ROM and spinal stability for chronic neck and low back pain with mobility deficits (PTRX).    OT: Discharged to home with outpatient therapy.  Progress towards therapy goal(s):  Goals met  Therapy recommendation(s):  Continued skilled OT recommended for IADL retraining, functional cognition, return to work, return to driving, and community reintegration.   ADL/IADLs: IND transfers/mobility no device. IND ADLs, SBA shower transfer/bathing. IND very simple IADLs, needing assist for moderate-complex IADLs due to cognitive deficits.   DME/AE: Built in shower bench.  Cognition: EFPT completed  - see POC note for results/interpretation. SLUMS .   Caregiver Support: Caregiver training completed  with spouse and daughter. Family to provide initial / support and then wean off as appropriate. Family in agreement to provide IADL support.     SLP: Discharged to home with outpatient therapy.  Progress towards therapy goal(s): Goals partially met.  Barriers to achieving goals: discharge from facility.  Therapy recommendation(s):  Continued therapy is recommended. Rationale/Recommendations:  Patient demonstrates mild-moderate cognitive-linguistic impairment with high level word finding, short-term recall, moderately complex problem solving, alternating attention, and reasoning skills. Overall, patient significant " improvement in cognitive function since admission to acute rehab unit, with primary focus on short-term recall, problem solving, and attention. Patient would benefit from OP SLP services to implement appropriate compensatory strategies to improve recall, attention, and problem solving. Increasing insight to deficits.    MEDICAL COURSE  Acute delirium/encephalopathy, improved  Noted during acute hospitalization beginning evening of 5/29.  Associated with confusion, agitation, visual hallucinations, combativeness, impulsive, pulling at lines, devices and threatening to hit at staff.  Felt to be likely in the setting of underlying brain pathology from medulloblastoma, significant hyponatremia early hospital course, coexisting temporal stroke.  Transferred to ICU and received Precedex infusion.  Psychiatry consulted, started on scheduled Seroquel and IV valproic acid.  Seroquel weaned down to as needed and delayed release oral Depakote started at bedtime, at 250 mg at ARU admission.  No ongoing significant agitation since 6/2.  Elected to defer repeat brain MRI due to contrast (JACK) and mental status now returned to baseline.  During ARU stay, no recurrence of agitation, hallucinations, combativeness, etc although still with some impulsiveness and cognition below baseline.  Ongoing wean of Depakote, with decrease to 125 mg at HS on 6/14 and discontinued on 6/17.  - Continue melatonin 5 mg at bedtime  - Continue PT/OT/SLP    Acute kidney injury, improved  During acute hopsitalization, developed JACK, felt likely contrast nephropathy given timing ~3 days after MRI brain with contrast.  Cr jumped from 0.88 to peak of 1.55, down-trending since.  Also suspect possible contribution of reduced oral fluid intake, NPO due to dysphagia on 5/31.  Kidney function improved with IV hydration, though Cr remained elevated at 1.40 at ARU admission on 6/7.  Has been slightly improved and stable during ARU stay, most recently at 1.26 on  6/17.  - Avoid further IV dye for now  - Follow up with PCP and repeat BMP.  Consider referral to nephrology if ongoing concerns.    Hyponatremia, resolved  Severe at presentation. Initial sodium 109 with unintentional rapid correction to 123, D5W given with reduction to 120.  Managed by nephrology.  [place] likely related to polydipsia since urine osmolality was not elevated and ADH physiology.  Patient was also on sertraline as outpatient, which was discontinued due to possible contribution.  Sodium with ongoing improvement, stable at 138 at ARU admission.  Continued on 1800 mL fluid restriction.  Initially dropped to 132 on 6/9, but WNL since 6/10 and stable at 136 on 6/17.  RD provided education to patient and family regarding maintaining fluid restriction at discharge.  - Continue fluid restriction of 1.8 L/day   - Per nephrology, avoid proton pump inhibitors, ACE inhibitors, thiazide diuretics, and SSRI medications for now.  - Follow up with PCP, repeat BMP    Seizures  Received valium per EMS, ongoing seizures.  Loaded with Keppra, benzodiazepine in ED. Neurology consulted.  Likely precipitated by severe hyponatremia.  Required intubation for airway protection.  EEG consistent with severe encephalopathy consistent with postictal state, sedation, hyponatremia, but no seizure activity observed.  Per neuro, likely will have prolonged recovery time given history of brain tumor and numerous chronic microhemorrhages, large amount of sedation that was initially used, and recovering hyponatremia.  Seizures resolved with stabilization of sodium levels.  Neurology did not feel AEDs indicated given underlying cause (hyponatremia) treated.  No recurrent seizure activity observed during ARU stay.     Incidental CVA  Noted on brain MRI 5/22 with 2 small infarcts in R temporal lobe.  Neurology followed patient during acute hospitalization and felt these areas were incidental findings, unrelated to his current presentation.   Repeat brain MRI 5/27 with interval increase in size of 2 areas of acute infarct in R temporal lobe.  - Continue PTA ASA 81 mg  - Continue PT/OT/SLP     Hx Cerebellar Medulloblastoma s/p surgical resection and chemotherapy 2007  - Brain imaging with post-op changes, no evidence of recurrent mass    Possible aspiration pneumonia  Acute respiratory failure with hypoxia, resolved  Required intubation for airway protection.  Extubated on 5/23 with no further airway concerns or hypoxia.  Productive cough initially.  Mild interstitial prominence of right lung base on x-ray.  Sputum culture with MRSA. Sensitivity noted.  Patient treated with IV vancomycin, with stop date of 5/31.  No concerns during ARU stay, stable on room air.    Gastroesophageal reflux  1 episode of emesis night of 6/4 but seemed related to specific food.  Elected to defer GI consult with no clear indication for EGD.  Started on famotidine rather than PPI due to recent significant hyponatremia.  No complaints of symptoms or recurrent emesis   - Continue famotidine 20 mg daily.    Elevated BP, resolved  Patient normotensive at discharge to ARU. Remains stable, normotensive to soft at times during ARU stay.  - Monitor BP    Nutrition  Dysphagia, resolved  Refused nasogastric tube 5/28.  SLP assessed 5/29 and recommended soft diet.  Discussed with speech therapy 6/2. Dysphagia correlates with declining mental status from encephalopathy.  Speech therapy recommended video swallow for 6/3 with speech to rule out silent aspiration. Results shows multiple episodes of penetration with thin liquid barium. No evidence of aspiration.  Diet upgraded to regular diet, thin liquids by speech therapy on 6/4.  Tolerated that diet throughout ARU stay.  Nutrition and SLP followed.  - Continue regular diet with thin liquids (fluid restriction as above)     Hyperlipidemia  PTA simvastatin held in setting of elevated CK (4034 at admission, felt like secondary to seizures).   "Per discharge summary, check CK in 2 days and resume simvastatin if normalized.  CK WNL, resumed.  - Continue simvastatin 20 mg at bedtime     Hx PTSD  [facility] psychology[facility]  Started on sertraline 25 mg daily on 4/9/21.  This admission, PTA sertraline discontinued in setting of severe hyponatremia.  No symptoms observed or reported during ARU stay.  - Continue to hold sertraline  - Follow up with outpatient psychiatry to consider alternative to sertraline for PTSD     Posterior neck pain  Present throughout acute hospitalization, per patient some chronic neck pain which he attributes to prior injury \"tubing\".  Denies radiation. Palpable tightness in right trapezius and levator muscles, palpation reproduces patient's pain.  Most likely myofascial pain with some facet mediated pain. No clinical s/s of cervical radiculopathy or myelopathy at this point. Improved with lidocaine patch.  - Continue lidocaine patch at night, Tylenol, heat PRN  - PT provided home exercises to target neck and back pain  - Discussed consideration for follow-up with PM&R as outpatient for injections if ongoing    DISCHARGE MEDICATIONS  Current Discharge Medication List      START taking these medications    Details   aspirin (ASA) 81 MG chewable tablet Take 1 tablet (81 mg) by mouth daily  Qty: 30 tablet, Refills: 0    Associated Diagnoses: Preventative health care      senna-docusate (SENOKOT-S/PERICOLACE) 8.6-50 MG tablet Take 1 tablet by mouth 2 times daily as needed for constipation  Qty: 10 tablet, Refills: 0    Associated Diagnoses: Constipation, unspecified constipation type         CONTINUE these medications which have CHANGED    Details   acetaminophen (TYLENOL) 325 MG tablet Take 1-2 tablets (325-650 mg) by mouth every 4 hours as needed for mild pain or fever  Qty:      Associated Diagnoses: Neck pain      famotidine (PEPCID) 10 MG tablet Take 1 tablet (10 mg) by mouth 2 times " daily  Qty: 60 tablet, Refills: 0    Associated Diagnoses: Gastroesophageal reflux disease with esophagitis without hemorrhage      Lidocaine (LIDOCARE) 4 % Patch Place 1 patch onto the skin every 24 hours To prevent lidocaine toxicity, patient should be patch free for 12 hrs daily.  Qty: 30 patch, Refills: 0    Associated Diagnoses: Neck pain      simvastatin (ZOCOR) 20 MG tablet Take 1 tablet (20 mg) by mouth At Bedtime  Qty: 30 tablet, Refills: 0    Associated Diagnoses: Hyperlipidemia LDL goal <70         CONTINUE these medications which have NOT CHANGED    Details   melatonin 5 MG CAPS Take 5 mg by mouth At Bedtime May repeat x1      melatonin 5 MG tablet Take 1 tablet (5 mg) by mouth At Bedtime  Qty: 30 tablet, Refills: 0    Associated Diagnoses: Sleep disturbance         STOP taking these medications       divalproex sodium delayed-release (DEPAKOTE) 250 MG DR tablet Comments:   Reason for Stopping:         Heparin Sodium 5000 UNIT/0.5ML injection Comments:   Reason for Stopping:         thiamine (B-1) 100 MG/ML injection Comments:   Reason for Stopping:                 DISCHARGE INSTRUCTIONS AND FOLLOW UP  Discharge Procedure Orders   Physical Therapy Referral   Standing Status: Future   Referral Priority: Routine Referral Type: Rehab Therapy Physical Therapy   Number of Visits Requested: 1     Occupational Therapy Referral   Standing Status: Future   Referral Priority: Routine Referral Type: Occupational Therapy   Number of Visits Requested: 1     Speech Therapy Referral   Standing Status: Future   Referral Priority: Routine Referral Type: Therapeutic Services   Number of Visits Requested: 1     Reason for your hospital stay   Order Comments: You were admitted for rehabilitation following a hospitalization for seizures in the setting of low sodium (hyponatremia) with associated encephalopathy.     Adult [facility] Follow-up and recommended labs and tests   Order Comments: Follow up with primary care provider,  Justen Arreaga, within 7 days for hospital follow- up.  The following labs/tests are recommended: BMP, CBC.    Follow up with psychiatry to consider alternative to sertraline for PTSD.    Follow up with PM&R (Dr. Granados).    Appointments on [place] and/or [facility] (with [facility] or [facility] provider or service). Call [phone] if you haven't heard regarding these appointments within 7 days of discharge.     Activity   Order Comments: Your activity upon discharge: activity as tolerated and as directed by therapies.  You are able to walk, transfer, and navigate stairs without the use of an assistive device or assistance from another person.  You are also able to perform activities of daily living (ADLs) without physical assistance, such as dressing, toileting, hygiene.  However, due to ongoing cognitive deficits, we recommend that you have 24/7 supervision/assistance from another person initially at discharge and assistance for moderate to higher level tasks such as medications, finances, appointments, meal preparation, community access, decision-making.  We also recommend ongoing outpatient physical, occupational, and speech therapies.  We advise against driving until evaluated and cleared by outpatient therapies.     Order Specific Question Answer Comments   Is discharge order? Yes      Monitor and record   Order Comments: blood pressure daily  fluid intake daily  Limit intake to 1800mL per day.     Full Code     Order Specific Question Answer Comments   Code status determined by: Discussion with patient/ legal decision maker      Diet   Order Comments: Follow this diet upon discharge:      Fluid restriction 1800 ML FLUID      Regular Diet Adult; Thin Liquids     Order Specific Question Answer Comments   Is discharge order? Yes           PHYSICAL EXAMINATION    Most recent Vital Signs:   Vitals:    06/16/21 1848 06/16/21 2122 06/17/21 1543 06/17/21 2029   BP: 97/62 103/68 95/54 108/67   BP Location: Right  arm Right arm Right arm Right arm   Pulse: 79 84 84 87   Resp: 18  18    Temp: 96.7  F (35.9  C)  96.8  F (36  C)    TempSrc: Oral  Oral    SpO2: 97% 95% 92%    Weight:       Height:           Gen: NAD, seated edge of bed, pleasant and cooperative  HEENT: well-healed scar over right parietal region, MMM, EOMI  Cardio: RRR, no murmurs  Pulm: non-labored on room air, lungs CTA bilaterally  Abd: soft, non-tender, non-distended, bowel sounds present  Extr: no edema or calf tenderness bilaterally  Neuro/MSK: alert, responds appropriately though slowed, follows commands, strength 5/5 in all muscle groups of bilateral upper and lower extremities, sensation intact to light touch in all 4 extremities, no dysmetria on bilateral finger-nose-finger    60 minutes spent in discharge, including >50% in counseling and coordination of care, medication review and plan of care recommended on follow up.     Patient was evaluated on day of discharge by attending physician, David Granados DO, who agrees with plan of care.    Discharge summary was forwarded to Justen Arreaga (PCP) at the time of discharge, so as to bridge from hospital to outpatient care.     It was our pleasure to care for Otoniel Gomez during this hospitalization. Please do not hesitate to contact me should there be questions regarding the hospital course or discharge plan.          Lorenza Draper PA-C  Physical Medicine and Rehabilitation

## 2021-06-17 NOTE — PLAN OF CARE
Occupational Therapy Discharge Summary    Reason for therapy discharge:    Discharged to home with outpatient therapy.    Progress towards therapy goal(s). See goals on Care Plan in [facility] electronic health record for goal details.  Goals met    Therapy recommendation(s):    Continued skilled OT recommended for IADL retraining, functional cognition, return to work, return to driving, and community reintegration.     Otoniel Gomez is a 55 year old male with past medical history of malignant neoplasm of brain s/p craniotomy and tumor resection who presented on 5/21 with seizures and severe hyponatremia with hospital course complicated by R temporal lobe infarcts on imaging, JACK, significant encephalopathy and agitation, possible aspiration pneumonia. Admitted to ARU on 6/8/21 for rehabilitation and ongoing medical management.    ADL/IADLs: IND transfers/mobility no device. IND ADLs, SBA shower transfer/bathing. IND very simple IADLs, needing assist for moderate-complex IADLs due to cognitive deficits.     DME/AE: Built in shower bench.    Cognition: EFPT completed 6/11 - see POC note for results/interpretation. SLUMS 18/30 6/9.     Caregiver Support: Caregiver training completed 6/16 with spouse and daughter. Family to provide initial 24/7 support and then wean off as appropriate. Family in agreement to provide IADL support.

## 2021-06-17 NOTE — PLAN OF CARE
FOCUS/GOAL  Medical management    ASSESSMENT, INTERVENTIONS AND CONTINUING PLAN FOR GOAL:  Patient slept troughout the night. Independent in his room, Writer unable to determine voiding status.  Reported LBM 6/13. No S/S of pain noted. Will continue with POC.

## 2021-06-17 NOTE — PLAN OF CARE
FOCUS/GOAL  Pain management, Medical management, Mobility, Skin integrity, and Safety management    ASSESSMENT, INTERVENTIONS AND CONTINUING PLAN FOR GOAL:  Patient is alert and oriented x 4, denies pain, dizziness, headache, chest pain or SOB. VS WDL.Regular/thin diet with good intake of 100%. Maintained fluid restriction in this shift. Requires reminder and explanation when request for more water and pt. Verbalized understanding. Independent in room without assistive devise, patient is unsteady with the balance but did ok ambulating independently to the bathroom. Contact precaution and seizure precaution maintained.Last Bm was 06/13 per documentation. PRN Senna 1 tab was given. Patient reminded to inform the staff when he have Bm since patient is independent in room. We will continue to monitor.

## 2021-06-17 NOTE — PLAN OF CARE
Patient is alert and oriented x4 but is forgetful. Complains of neck pain. Lidocaine patch applied with good effect per patient report. Patient is independent in the room with no assistive device. Continent of BB using toilet in bathroom, LBM 6/16 per patient report. Patient is tolerating diet well with good appetite. 1800 FR maintained. 760mL/1800mL drank this shift. Patient's wife and daughter at bedside now, helping to monitor fluid consumption. Call light is within reach, continue with POC.

## 2021-06-17 NOTE — PLAN OF CARE
"Physical Therapy Discharge Summary    Reason for therapy discharge:    Pt has met all goals for IND mobility without assistive device. Assist from family for higher level cognitive tasks.    Progress towards therapy goal(s). See goals on Care Plan in [facility] electronic health record for goal details.  Goals met  Balance Tests on 21:  TU.5 sec without device  30\" sit<>stand: 17x without UE  MURRAY/56  Functional Gait Assessment:     Therapy recommendation(s):    Pt to transition to OP PT ([facility]) for continued balance impairments (notes to be approx. 80-90% of baseline).  Pt has been issued HEP for standing balance and LE coordination (handout), as well as ROM and spinal stability for chronic neck and low back pain with mobility deficits (PTRX).    "

## 2021-06-17 NOTE — CARE CONFERENCE
Acute Rehab Care Conference/Team Rounds      Type: Team Rounds    Present: Dr. 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   Seizures and contact precautions.    Pain: Denied    Medications, Skin, Tubes/Lines: Takes meds whole with water. No Tubes/lines. No skin issue.    Swallowing/Nutrition: 1800 FR. Regular textures and Thin liquids.    Bowel/Bladder: Continent of bowel and bladder. Independently uses toilet. lbm 6/16 per patient report    Psychosocial: , has two adult children. Works desk job, light duty as a . Indep PTA. PTSD reported. No substance abuse or financial concerns.     ADLs/IADLs: Pt progressing with ADLs/IADLs. Pt is IND transfers/mobility no device. Pt is IND ADLs and simple IADLs. Pt requiring supervision moderate-complex IADLs due to cognitive deficits. Functionally, demonstrates deficits with attention, recall, and problem solving. Pt on track to discharge home tomorrow. Caregiver training completed 6/16 with spouse and daughter, family able to provide recommended level of assist. Recommend follow up OP OT for return to work, return to driving, and community reintegration. DME/AE needs: none.    Mobility: Pt independent with all mobility, including bed mobility, transfers, ambulation 500+ ft, mod I reciprocal stairs with single rail, floor transfer. SBA recommended for outside mobility without  device, considering mild, intermittent imbalance with uneven surfaces. Pt on track for discharge home with family without assistive device, with plan to continue with OP PT.    Cognition/Language: Mild-Moderate short-term recall, alternating attention, and complex problem solving. Improved performance and overall progress during SLP services. Will continue to benefit from OP SLP services to eventually return to work. Assistance for finances and medications will be important. Word finding significantly improved.    Community Re-Entry: Plan to continue with OP PT to address remaining balance impairments.     Transportation: IND with car transfer. Family to provide transportation.    Decision maker: self and significant other    Plan of Care and goals reviewed and updated.    Discharge Plan/Recommendations    Fall Precautions: continue    Patient/Family input to goals: yes       Estimated length of stay: 12 days     Overall plan for the patient: reach a level of mod I       Utilization Review and Continued Stay Justification    Medical Necessity Criteria:    For any criteria that is not met, please document reason and plan for discharge, transfer, or modification of plan of care to address.    Requires intensive rehabilitation program to treat functional deficits?: Yes    Requires 3x per week or greater involvement of rehabilitation physician to oversee rehabilitation program?: Yes    Requires rehabilitation nursing interventions?: Yes    Patient is making functional progress?: Yes    There is a potential for additional functional progress? Yes    Patient is participating in therapy 3 hours per day a minimum of 5 days per week or 15 hours per week in 7 day period?:Yes    Has discharge needs that require coordinated discharge planning approach?:Yes      Barriers/Concerns related to meeting medical necessity criteria:  none    Team Plan to Address Concern:  As needed       Final Physician Sign off    Statement of  Approval:  David Granados,       Patient Goals  Social Work Goals: Home tomorrow with family A and OP. No SW needs at this time.     OT Frequency: 60-90 min daily  OT goal: hygiene/grooming: independent  OT goal: upper body dressing: Independent  OT goal: lower body dressing: Modified independent  OT goal: upper body bathing: Supervision/stand-by assist  OT goal: lower body bathing: Supervision/stand-by assist  OT goal: toilet transfer/toileting: Modified independent  OT goal: meal preparation: Supervision/stand-by assist  OT goal: home management: Supervision/stand-by assist  OT goal 1: Pt will demo IND with BUE strengthening and endurance HEP.  OT goal 2: Pt will demo SBA shower transfer using DME/AE prn    ALL MET:  PT Frequency: Daily x60-90 min  PT goal: transfers: Independent, Sit to/from stand, Bed to/from chair  PT goal: gait: Independent(500+ ft without device including turning and on uneven surfaces)  PT goal: stairs: Modified independent, 5 stairs, Rail on right  PT goal 1: Pt will be independent with standing balance, LE coordination HEP to further reduce risk of falls.  PT Goal 2: Pt will be able to perform car transfer IND in order to safely discharge home and access medical appts.  PT Goal 3: Pt will be able to perform floor>furniture transfer with SBA as part of fall recovery training.       SLP Frequency: daily  SLP goal 1: Patient will implement word-finding strategies at the conversational level with rare (0-24%) auditory cues to increase functional expression of wants and needs and expression during work-related tasks.  SLP goal 2: Patient will recall and implement compensatory memory strategies during structured tasks with 80% accy provided moderate auditory cues in order to improve recall of functional information.  SLP goal 3: Patient will solve moderately-complex problem solving tasks with 80% accy and usual (50-74%) moderate auditory cues to improve functional problem solving skills  for transition to work-related tasks.

## 2021-06-17 NOTE — DISCHARGE SUMMARY
Speech Language Therapy Discharge Summary    Reason for therapy discharge:    Discharged to home with outpatient therapy.    Progress towards therapy goal(s). See goals on Care Plan in Epic electronic health record for goal details.  Goals partially met.  Barriers to achieving goals:   discharge from facility.    Therapy recommendation(s):    Continued therapy is recommended.  Rationale/Recommendations:  Patient demonstrates mild-moderate cognitive-linguistic impairment with high level word finding, short-term recall, moderately complex problem solving, alternating attention, and reasoning skills. Overall, patient significant improvement in cognitive function since admission to acute rehab unit, with primary focus on short-term recall, problem solving, and attention. Patient would benefit from OP SLP services to implement appropriate compensatory strategies to improve recall, attention, and problem solving. Increasing insight to deficits. .

## 2021-06-18 VITALS
HEART RATE: 81 BPM | HEIGHT: 66 IN | WEIGHT: 149.9 LBS | DIASTOLIC BLOOD PRESSURE: 70 MMHG | SYSTOLIC BLOOD PRESSURE: 110 MMHG | BODY MASS INDEX: 24.09 KG/M2 | OXYGEN SATURATION: 97 % | TEMPERATURE: 95.8 F | RESPIRATION RATE: 18 BRPM

## 2021-06-18 PROCEDURE — 99239 HOSP IP/OBS DSCHRG MGMT >30: CPT | Performed by: PHYSICAL MEDICINE & REHABILITATION

## 2021-06-18 PROCEDURE — 250N000013 HC RX MED GY IP 250 OP 250 PS 637: Performed by: PHYSICIAN ASSISTANT

## 2021-06-18 RX ADMIN — LIDOCAINE 1 PATCH: 560 PATCH PERCUTANEOUS; TOPICAL; TRANSDERMAL at 08:50

## 2021-06-18 RX ADMIN — FAMOTIDINE 10 MG: 10 TABLET ORAL at 08:50

## 2021-06-18 RX ADMIN — ACETAMINOPHEN 650 MG: 325 TABLET, FILM COATED ORAL at 09:19

## 2021-06-18 RX ADMIN — ASPIRIN 81 MG CHEWABLE TABLET 81 MG: 81 TABLET CHEWABLE at 08:50

## 2021-06-18 NOTE — PLAN OF CARE
Patient has been independent in the room with no device.  Continent of bowel and bladder on the toilet.  LB 6/16.    Had a total of 440 ml in this shift.  1200 ml in total today from day and kamala shift.  Denied pain or discomfort. Lidocaine patch removed from neck this evening.

## 2021-06-18 NOTE — PLAN OF CARE
FOCUS/GOAL  Discharge planning    ASSESSMENT, INTERVENTIONS AND CONTINUING PLAN FOR GOAL:  A/O, VS stable. Pt discharged on this shift. AVS discussed with pt and family, all questions answered. Medications given. No current concerns. Pt discharged to family transport home.

## 2021-06-18 NOTE — DISCHARGE INSTRUCTIONS
"Follow up Appointments    - Primary Care  You are scheduled for a virtual visit with Dr. Gan on Thursday, June 24th at 12:50 PM. You will need to download the artemio \"Google Duo\" from the artemio store, follow the instructions to set up an account and your appointment will then be through this application.    Address  [address] [address]    Phone   [phone]  Fax                  [phone]    - Physical Medicine & Rehabilitation  You are scheduled to see Dr. Granados on Monday, August 23rd at 2:15 PM.    Address  [facility]                          PM&R Clinic - Floor 3                          [address] [place] [address]  Phone   [place]: [phone]    - Psychiatry  Please consult with your primary care provider regarding a referral for psychiatry.  "

## 2021-06-18 NOTE — PLAN OF CARE
FOCUS/GOAL  Medication management and Medical management    ASSESSMENT, INTERVENTIONS AND CONTINUING PLAN FOR GOAL:  A&O, ind in room. Makes needs known, no alarms.  Pt denied pain during night.  Continent of bowel and bladder, LBM 6/16.  Quiet night, discharging today.
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
## 2021-06-24 NOTE — TELEPHONE ENCOUNTER
Patient called again because he doesn't understand why he needs to be seen at the  [facility]. I spoke to him for quite some time and reiterated that the [facility] offers non-surgical conservative treatments and since he hasn't been to our clinic in a year and a half, our neurosurgeon would not be comfortable ordering an injection. Pt said he's been able to 'back door' this before. He finally seemed to understand that he will need to f/u w consult at [facility].

## 2021-06-24 NOTE — PATIENT INSTRUCTIONS - HE
Recommend he continue doing her physical therapy exercises on a daily basis.    I have ordered facet joint injections at L4-5 and L5-S1 bilaterally.  He will need a  for this injection.    It be fine for you to try Aleve 220 mg 1 or 2 times daily.  You may find that this is more effective than the ibuprofen.  Please do not take ibuprofen and Aleve in the same day.  Please take this medication with food.    ~Please call Nurse Navigation line [phone] with any questions or concerns about your treatment plan, if symptoms worsen and you would like to be seen urgently, or if you have problems controlling bladder and bowel function.

## 2021-06-24 NOTE — TELEPHONE ENCOUNTER
Called pt and informed him that since he is not surgical he needs to have a consult with [facility] to monitor his injections. Also informed him that he has not been seen in almost 2 years and needs to be re-evaluated. Dr. Hernandez put in referral to [facility] for consult.   CEDRICK Aguillon

## 2021-06-24 NOTE — PATIENT INSTRUCTIONS - HE
Follow-up visit in 2 weeks with Dr. Chopra to discuss injection outcome and determine care plan going forward.       DISCHARGE INSTRUCTIONS    During office hours (8:00 a.m.- 4:30 p.m.) questions or concerns may be answered  by calling Spine Navigation Nurses at  [phone].     If you experience any problems after hours  please call [phone] and you will be connected to [facility].     All Patients:    ? You may experience an increase in your symptoms for the first 2 days (It may take anywhere between 2 days- 2 weeks for the steroid to have maximum effect).    ? You may use ice on the injection site, as frequently as 20 minutes each hour if needed.    ? You may take your pain medicine.    ? You may continue taking your regular medication after your injection. If you have had a Medial Branch Block you may resume pain medication once your pain diary is completed.    ? You may shower. No swimming, tub bath or hot tub for 48 hours.  You may remove your bandaid/bandage as soon as you are home.    ? You may resume light activities, as tolerated.    ? Resume your usual diet as tolerated.    ? It is strongly advised that you do not drive for 1-3 hours post injection.       POSSIBLE STEROID SIDE EFFECTS (If steroid/cortisone was used for your procedure)    -If you experience these symptoms, it should only last for a short period      Swelling of the legs                Skin redness (flushing)       Mouth (oral) irritation     Blood sugar (glucose) levels              Sweats                      Mood changes    Headache    Sleeplessness         POSSIBLE PROCEDURE SIDE EFFECTS  -Call the [facility] Center if you are concerned    Increased Pain             Increased numbness/tingling        Nausea/Vomiting            Bruising/bleeding at site        Redness or swelling                                                Difficulty walking        Weakness             Fever greater than 100.5    *In the event of  a severe headache after an epidural steroid injection that is relieved by lying down, please call the [facility] to speak with a clinical staff member*

## 2021-07-07 ENCOUNTER — HOSPITAL ENCOUNTER (OUTPATIENT)
Dept: OCCUPATIONAL THERAPY | Facility: CLINIC | Age: 56
Setting detail: THERAPIES SERIES
End: 2021-07-07
Attending: PHYSICIAN ASSISTANT
Payer: COMMERCIAL

## 2021-07-07 DIAGNOSIS — G93.40 ENCEPHALOPATHY: ICD-10-CM

## 2021-07-07 PROCEDURE — 97166 OT EVAL MOD COMPLEX 45 MIN: CPT | Mod: GO | Performed by: OCCUPATIONAL THERAPIST

## 2021-07-07 PROCEDURE — 97530 THERAPEUTIC ACTIVITIES: CPT | Mod: GO | Performed by: OCCUPATIONAL THERAPIST

## 2021-07-07 NOTE — PROGRESS NOTES
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  Pt presents with generalized weakness, decreased endurance, decreased memory and executive functioning.    Onset of Illness/Injury or Date of Surgery 05/20/21   Surgical/Medical History Reviewed Yes   Additional Occupational Profile Info/Pertinent History of Current Problem Pt was hospitalized and then transitioned to ARU.  Pt discharged from ARU 6/18/2021 to home.    Comments/Observations Pt reported feeling very tired.  Pt stated he does the exercises he was given by OT/PT, daily.    Role/Living Environment   Current Community Support Family/friend caregiver   Patient role/Employment history Other/comments  (on leave from work-law enforcement)   Community/Avocational Activities go to the family cabin, fishing, gardening, do housework, physically active    Current Living Environment House   Number of Stairs to Enter Home 6   Number of Stairs Within Home 10-15   Primary Bathroom Set Up/Equipment   (has a built-in shower chair)   Home/Community Accessibility Comments pt reported ability to navigate home well but does notice it is harder when he is tired or at the end of the day   Prior Level - Transfers Independent   Prior Level - Ambulation Independent   Prior Level - ADLS Independent   Prior Responsibilities - IADL Meal Preparation;Laundry;Shopping;Yardwork;Medication management;Finances;Driving  (pt has not done these IADLs since hospitalization 5/20)   Patient/family Goals Statement return to driving, get stronger, build up endurance, \"think quicker\"   Pain   Patient currently in pain No   Pain comments intermittent neck ache, back pain and " "headaches   Fall Risk Screen   Fall screen completed by OT   Have you fallen 2 or more times in the past year? No   Have you fallen and had an injury in the past year? No   Is patient a fall risk? No   Abuse Screen (yes response referral indicated)   Feels Unsafe at Home or Work/School no   Feels Threatened by Someone no   Does Anyone Try to Keep You From Having Contact with Others or Doing Things Outside Your Home? no   Physical Signs of Abuse Present no   Cognitive Status Examination   Orientation Orientation to person, place and time   Level of Consciousness Alert   Follows Commands and Answers Questions 100% of the time   Cognitive Comment Pt scored 23/20 on MoCA version 7.2-below \"normal\".  Pt recalled 2/5 words after delay.  Unable to improve score wtih cues.    Visual Perception   Visual Perception No deficits were identified   Visual Perception Comments wears trifocal   Sensation   Sensation Comments pt reports intact sensation   Posture   Posture Not impaired   Range of Motion (ROM)   ROM Comments BUE AROM WNL for all joints   Strength   Strength Comments BUE strength grossly 5/5 as indicated by MMT   Hand Strength   Hand Dominance Right   Hand Strength Comments handwriting shakey   Functional Mobility   Ambulation IND   Bathing   Level of [place] - Bathing independent   Upper Body Dressing   Level of [place]: Dress Upper Body independent   Lower Body Dressing   Level of [place]: Dress Lower Body independent   Toileting   Level of [place]: Toilet independent   Grooming   Level of [place]: Grooming independent   Eating/Self-Feeding   Level of Addison: Eating independent   Activity Tolerance   Activity Tolerance fair, takes naps every once in awhile   Adult OT Eval Goals   OT Eval Goals (Adult) 1;2;3;4    OT Goal 1   Goal Identifier 1 HEP   Goal Description Pt will report compliance with home program 7/7 days including BUE strengthening exercises and calisthenic exercises to " improve strength and endurance for IADLs.    Target Date 09/01/21    OT Goal 2   Goal Identifier 2 memory   Goal Description Pt will demonstrate 100% accuracy on memory recall and word finding activities/worksheets to improve function in I/ADLs.   Target Date 09/01/21    OT Goal 3   Goal Identifier 3 executive functioning   Goal Description Pt will demonstrate 100% accuracy in 2 executive functioning tasks including problem solving, planning and organizing for success with IADLs including medication set-up, driving, etc.    Target Date 09/01/21   OT Goal 4   Goal Identifier 4 driving   Goal Description Pt will completing [employer] driving screen with a  PASS  score on 3/3 subtests and right foot reaction time test for pt to return to driving.     Target Date 09/01/21   Clinical Impression   Criteria for Skilled Therapeutic Interventions Met Yes, treatment indicated   OT Diagnosis decreased IADL performance   Assessment of Occupational Performance 1-3 Performance Deficits   Identified Performance Deficits not able to drive, not able to work, needs assist with medication set-up, not at baseline with executive functioning   Clinical Decision Making (Complexity) Moderate complexity   Therapy Frequency 1x/week   Predicted Duration of Therapy Intervention (days/wks) x6 visits with in 8 weeks   Risks and Benefits of Treatment have been explained. Yes   Patient, Family & other staff in agreement with plan of care Yes   Clinical Impression Comments Pt presents with decreased IADL performance due to decreased executive, decreased memory, weakness and decreased endurance.  Pt will benefit from continued skilled OT intervention to develop home program and to have pt participate in activities to improve cognition, executive functioning, endurance and strength for IADLs.    Total Evaluation Time   OT Gerhard Moderate Complexity Minutes (98615) 25

## 2021-07-07 NOTE — DISCHARGE INSTRUCTIONS
"OT Instructions 7/7/2021:  1.  Bring all your exercises and stretches (OT and PT) to your next therapy appointment.   2.  Perform the \"60 second letter game\".  Pick one letter from the alphabet and try to say as many words as you can that start with that letter in 60 seconds.  3.  Category task.  Pick a category and try to name as many items that fit into that category ie) fruit, vegetables, [place] sports teams, makes of cars  4.  Try mowing the lawn.  Do this in the morning when your energy level is greatest.  Have someone there to observe the first time you do this.  If you get tired after mowing the front yard, stop and do the backyard the next day.   "

## 2021-07-15 ENCOUNTER — HOSPITAL ENCOUNTER (OUTPATIENT)
Dept: OCCUPATIONAL THERAPY | Facility: CLINIC | Age: 56
Setting detail: THERAPIES SERIES
End: 2021-07-15
Attending: PHYSICIAN ASSISTANT
Payer: COMMERCIAL

## 2021-07-15 PROCEDURE — 97530 THERAPEUTIC ACTIVITIES: CPT | Mod: GO | Performed by: OCCUPATIONAL THERAPIST

## 2021-07-26 ENCOUNTER — HOSPITAL ENCOUNTER (OUTPATIENT)
Dept: PHYSICAL THERAPY | Facility: CLINIC | Age: 56
Setting detail: THERAPIES SERIES
End: 2021-07-26
Attending: PHYSICIAN ASSISTANT
Payer: COMMERCIAL

## 2021-07-26 PROCEDURE — 97161 PT EVAL LOW COMPLEX 20 MIN: CPT | Mod: GP | Performed by: PHYSICAL THERAPIST

## 2021-07-26 PROCEDURE — 97110 THERAPEUTIC EXERCISES: CPT | Mod: GP | Performed by: PHYSICAL THERAPIST

## 2021-07-26 NOTE — PROGRESS NOTES
21 1400   Signing Clinician's Name / Credentials   Signing clinician's name / credentials Patricia Barclay DPT NCS   Functional Gait Assessment (ZANE Puga., FLETCHER Sanford, et al. (2004))   1. GAIT LEVEL SURFACE 3  (5.03)   2. CHANGE IN GAIT SPEED 3   3. GAIT WITH HORIZONTAL HEAD TURNS 2  (slows, weaves a bit. )   4. GAIT WITH VERTICAL HEAD TURNS 2  (slows. Feels better to him than side to side)   5. GAIT AND PIVOT TURN 3  (repeated this and he is solid)   6. STEP OVER OBSTACLE 3   7. GAIT WITH NARROW BASE OF SUPPORT 0   8. GAIT WITH EYES CLOSED 2  (weaves to right, feels unsafe. slides feet-SWAT team techniq)   9. AMBULATING BACKWARDS 2  (a little cautious, )   10. STEPS 3   Total Functional Gait Assessment Score   TOTAL SCORE: (MAXIMUM SCORE 30) 23   Functional Gait Assessment (FGA): The FGA assesses postural stability during various walking tasks.   Gait assistive device used: None    Patient Score: 23/30  Scores of <22/30 have been correlated with predicting falls in community-dwelling older adults according to Vivien & Kamran 2010.   Scores of <18/30 have been correlated with increased risk for falls in patients with Parkinsons Disease according to [place], Kaminski et al 2014.  Minimal Detectable Change for patients with acute/chronic stroke = 4.2 according to Thipam & Ritschhansa 2009  Minimal Detectable Change for patients with vestibular disorder = 8 according to Vivien & Kamran 2010    Assessment (rationale for performing, application to patient s function & care plan): not at risk of falls. Chronic balance issue s/p cerebellar medulloblastoma treatment in   Minutes billed as physical performance test: part of evaluation    Patricia Barclay DPHERACLIO, MPT, NCS  Physical Therapist   Board Certified Neurologic Clinical Specialist     [facility], [facility]   [address] [place] [address]   [email or web address]  [email or web address]   [phone]    Clinic: [phone] Mondays/Wednesdays/Thursdays   Fax: [phone]

## 2021-07-26 NOTE — PROGRESS NOTES
07/26/21 1300   General Information   Start of Care Date 07/26/21   Referring Physician MIRNA Draper   Orders Evaluate and Treat as Indicated   Additional Orders recent brain MRI shows temporal lobe infarcts 5/2021.    Order Date 06/17/21   Medical Diagnosis Encephalopathy  (acute delirium, JACK)   Onset of illness/injury or Date of Surgery 05/29/21   Special Instructions PTSD: [employer]  for 31 years. 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 Has not worked since 4/2021   Surgical/Medical history reviewed Yes  (cerebellar medulloblastoma, crani/tumor resection chemo 2007)   Pertinent history of current problem (include personal factors and/or comorbidities that impact the POC) driving is a big thing. Trying to not get hurt. I did the lawn last time with riding mower, son watched him. Push mower for trim and weed whip not done yet. Balance is not the best. That is when my balance got bad (the cancer of the brain). Dizziness comes and goes. Worse when quickly looks back an forth and if I work out hard then can get dizzi. Non in bed or in a chair.    Pertinent Visual History  glasses   Prior level of function comment cant drive right now. IND in house and yard. Elliptical trying to do every other day 10 minutes then TM for 10 minutes, sometimes has incline on TM. 3.0 to 3.3 MPHS with no handrails. HEP from ARU he is still doing but added wts or doubled the amount of reps.He reports: 3-4 hours of doing all the exs every day. back exs take an hour to do.   Previous/Current Treatment Other   Other treatment ARU from 6/8 to 6/18/2021   Current Community Support Family/friend caregiver  (wife. Kids do not live at home:son 29, dtr 24)   Patient role/Employment history Disabled  ([facility] [employer] , light duty since 2007)   [facility]   Home/Community Accessibility Comments ranch with a basement,  garage attached   Patient/Family Goals Statement  Get my strength back, get my balance back  if I can.    Fall Risk Screen   Fall screen completed by PT   Have you fallen 2 or more times in the past year? No   Have you fallen and had an injury in the past year? No   Is patient a fall risk? No   Pain   Pain comments back pain no longer has this.    Cognitive Status Examination   Cognitive Comment speech: at times there is a slight delay to his response.    Range of Motion (ROM)   ROM Comment CROM WNLs including retraction/protraction. Neck injury from tubing about 2 years ago.    Gait   Gait Comments 25fTW at fast speed 4.38 seconds and 10 steps. Slight weave again.   Gait Special Tests 25 Foot Timed Walk   Seconds 6.37   Steps 12 Steps   Comments normal NORMA, symmetrical steps, arms swing. Slight weave-repeated multiple times.   Gait Special Tests Functional Gait Assessment Score out of 30   Score out of 30 23   Balance Special Tests Sit to Stand Reps in 30 Seconds   Reps in 30 seconds 11   Height 18   Comments good control. This was 17 reps on ARU discharge   Clinical Impression   Criteria for Skilled Therapeutic Interventions Met yes, treatment indicated   Influenced by the following impairments dynamic postural control, ??back pain, cognition. PTSD   Functional limitations due to impairments affects household/community mobility, recreational activity, work   Clinical Presentation Stable/Uncomplicated   Clinical Presentation Rationale medical history, impairments, FGA, sit to stand test and clinical judgement   Clinical Decision Making (Complexity) Low complexity   Therapy Frequency other (see comments)  (every 1-2 weeks, 4-5 visits. )   Predicted Duration of Therapy Intervention (days/wks) 3 months   Risk & Benefits of therapy have been explained Yes   Patient, Family & other staff in agreement with plan of care Yes   Clinical Impression Comments He is doign well s/p recent hospitilization for encephalopathy. His hx of bran cancer has affected his balance/coordination. He needs to have his HEP revised. He  needs guidance on exercise/activity at home.    Goal 1   Goal Identifier HEP   Goal Description He will be able to perform HEP for leg strengthening, arm strengthening and balance IND daily. He will be able to demonstrate his HEP   Target Date 10/26/21   Goal 2   Goal Identifier 6MWT   Goal Description He will be able to complete a 6MWT at > 1.1 m/s for community mobility (exercise)   Target Date 21   Goal 3   Goal Identifier sit ot stand test   Goal Description Improve on sit ot stand test to be > 15 reps in 30 seconds for improved leg strength and dynamic balance   Target Date 21   Total Evaluation Time   PT Eval, Low Complexity Minutes (43794) 30   Patricia Barclay DPT, MPT, NCS  Physical Therapist   Board Certified Neurologic Clinical Specialist     [facility], [facility] Level   [address] [place] [address]   [email or web address]   [phone]   Clinic: [phone] //   Fax: [phone]

## 2021-07-27 ENCOUNTER — HOSPITAL ENCOUNTER (OUTPATIENT)
Dept: SPEECH THERAPY | Facility: CLINIC | Age: 56
Setting detail: THERAPIES SERIES
End: 2021-07-27
Attending: PHYSICIAN ASSISTANT
Payer: COMMERCIAL

## 2021-07-27 PROCEDURE — 92507 TX SP LANG VOICE COMM INDIV: CPT | Mod: GN | Performed by: SPEECH-LANGUAGE PATHOLOGIST

## 2021-07-27 PROCEDURE — 92523 SPEECH SOUND LANG COMPREHEN: CPT | Mod: GN,52 | Performed by: SPEECH-LANGUAGE PATHOLOGIST

## 2021-07-29 ENCOUNTER — HOSPITAL ENCOUNTER (OUTPATIENT)
Dept: OCCUPATIONAL THERAPY | Facility: CLINIC | Age: 56
Setting detail: THERAPIES SERIES
End: 2021-07-29
Attending: PHYSICIAN ASSISTANT
Payer: COMMERCIAL

## 2021-07-29 PROCEDURE — 97530 THERAPEUTIC ACTIVITIES: CPT | Mod: GO | Performed by: OCCUPATIONAL THERAPIST

## 2021-08-09 ENCOUNTER — HOSPITAL ENCOUNTER (OUTPATIENT)
Dept: PHYSICAL THERAPY | Facility: CLINIC | Age: 56
Setting detail: THERAPIES SERIES
End: 2021-08-09
Attending: PHYSICIAN ASSISTANT
Payer: COMMERCIAL

## 2021-08-09 ENCOUNTER — HOSPITAL ENCOUNTER (OUTPATIENT)
Dept: OCCUPATIONAL THERAPY | Facility: CLINIC | Age: 56
Setting detail: THERAPIES SERIES
End: 2021-08-09
Attending: PHYSICIAN ASSISTANT
Payer: COMMERCIAL

## 2021-08-09 PROCEDURE — 97110 THERAPEUTIC EXERCISES: CPT | Mod: GP | Performed by: PHYSICAL THERAPIST

## 2021-08-09 PROCEDURE — 97116 GAIT TRAINING THERAPY: CPT | Mod: GP | Performed by: PHYSICAL THERAPIST

## 2021-08-09 PROCEDURE — 97112 NEUROMUSCULAR REEDUCATION: CPT | Mod: GP | Performed by: PHYSICAL THERAPIST

## 2021-08-09 PROCEDURE — 97530 THERAPEUTIC ACTIVITIES: CPT | Mod: GO | Performed by: OCCUPATIONAL THERAPIST

## 2021-08-10 ENCOUNTER — HOSPITAL ENCOUNTER (OUTPATIENT)
Dept: SPEECH THERAPY | Facility: CLINIC | Age: 56
Setting detail: THERAPIES SERIES
End: 2021-08-10
Attending: PHYSICIAN ASSISTANT
Payer: COMMERCIAL

## 2021-08-10 PROCEDURE — 92507 TX SP LANG VOICE COMM INDIV: CPT | Mod: GN | Performed by: SPEECH-LANGUAGE PATHOLOGIST

## 2021-08-10 NOTE — PROGRESS NOTES
Adult Outpatient Language Evaluation   07/27/21 1300       Present No   General Information   Type of Evaluation Speech and Language   Type Of Visit Initial   Start Of Care Date 07/27/21   Referring Physician Lorenza Draper MD   Orders Evaluate And Treat   Orders Comment Cognitive Deficits   Medical Diagnosis Encephalopathy   Onset Of Illness/injury Or Date Of Surgery 06/17/21  (Order date.)   Precautions/Limitations  no known precautions/limitations   Hearing Otoniel wears hearing aids.   Surgical/Medical history reviewed Yes   Pertinent History Of Current Problem Otoniel has a history of cerebellar medulloblastoma in 2007. Otoniel was hospitalized on 5/20/2021 for encephalopathy, seizure and JACK. He has now returned home and is reporting changes to his communication. These changes include feeling overwhelmed in receptive situations and feeling as if he knows the word and it's on the tip of his tongue, but he can't get it out. He reports these deficits vary in severity depending on the day. When he is tired his communication is poorer. This typically occurs later in the day.   Current Community Support  Family/friend caregiver   Patient Role/employment History Other/comments  (Medical leave from law enforcement.)   Living environment [facility]  (With his wife, Cordelia.)   Patient/family Goals To regain his previous communication abilities.   Fall Risk Screen   Fall screen comments Otoniel was evaluated by PT yesterday, 7/26/2021, and will be followed.   Abuse Screen (yes response referral indicated)   Feels Unsafe at Home or Work/School no   Feels Threatened by Someone no   Does Anyone Try to Keep You From Having Contact with Others or Doing Things Outside Your Home? no   Physical Signs of Abuse Present no   Speech   Speech Comments Speech was 100% intelligible throughout today's evaluation. No concerns observed or reported.   Language: Auditory Comprehension (understanding of spoken language)    Tests were administered at the following levels Complex (vocation/community/social activities)   Commands; Valders Diagnostic Aphasia Exam 3 (out of 15 total) 15   Paragraph; Discourse Comprehension Test (out of 8 total; less than 7 is below mean) 6.5   Functional Assessment Scale (Auditory Comprehension) Mild Impairment   Comments (Auditory Comprehension) Based on his performance in testing tasks, Otoniel presents today with a mild receptive language impairment. These impairments may worsen with fatigue, stress and other factors. They are also worsened with longer and more complex material.   Language: Verbal Expression (use of spoken language to express information)   Tests were administered at the following levels Moderate (routine daily activities);Complex (vocation/community/social activities)   Valders Naming Test, start at 30 (out of 60 total) 50  (Benefited from phonemic cues.)   Generate Sentences; [place] Test for Differential Diagnosis Of Aphasia (out of 6 total) 6   Define Words; [place] Test for Differential Diagnosis Of Aphasia (out of 10 total) 9   Generative Naming Score; Cognitive Linguistic Quick Test 7   Generative Naming; Cognitive Linguistic Quick Test Result WNL   Functional Assessment Scale (Verbal Expression) Mild Impairment   Comments (Verbal Expression) Based on his performance in testing tasks, Otoniel presents with mild expressive language impairments. These impairments were primarily observed in confrontational naming tasks such as naming pictured items. These impairments are likely to be worse when Otoniel is tired, stressed or under a time pressure.   Cognitive Status Examination   Cognitive Status Exam Comments Otoniel is being followed by OT for cognitive concerns.   Education Assessment   Barriers to Learning Cognitive;Language   Preferred Learning Style Reading  (To provide material for later recall.)   General Therapy Interventions   Planned Therapy Interventions Language   Language  Auditory comprehension;Verbal expression   Clinical Impression, SLP Eval   Criteria for Skilled Therapeutic Interventions Met (SLP Eval) yes;treatment indicated   SLP Diagnosis Otoniel presents with mild receptive and expressive language impairments. He would benefit from targeting of high level language skills for improved functional communication.   Functional limitations due to impairments Occasional decreased efficiency of communication. Effectiveness not impacted at this time.   Therapy Frequency 1 time;per week   Predicted Duration of Therapy Intervention (days/wks) 8 sessions   Risks and Benefits of Treatment have been explained. Yes   Patient, Family & other staff in agreement with plan of care Yes   Clinical Impression Comments Otoniel presents with mild receptive and expressive language impairments. These are characterized by feeling overwhelmed and not grasping all details in long or complex information said to him. Expressively this appears as word-finding difficulty with either blanking on the word or producing a similar, but not accurate word. He would benefit from targeting of high level language skills for improved functional communication.   Language/Cognition Goals   Language/Cognition Goals 1;2   Language/Cognition Goal 1   Goal Identifier receptive   Goal Description Otoniel will demonstrate at least 90% accuracy with complex, paragraph length material provided no more than mild cues and repetitions for improved functional communication.   Target Date 10/27/21   Language/Cognition Goal 2   Goal Identifier expressive   Goal Description Otoniel will completed complex level expressive language tasks with at least 90% accuracy provided no more than mild cues for improved functional communication.   Target Date 10/27/21   Total Session Time   Sound production with lang comprehension and expression minutes (89915) 40   Total Evaluation Time 40     Madison Ordonez MA, [facility]-SLP  [facility] [place]  [facility]  [phone] (Phone)  [phone] (Fax)

## 2021-08-16 ENCOUNTER — HOSPITAL ENCOUNTER (OUTPATIENT)
Dept: SPEECH THERAPY | Facility: CLINIC | Age: 56
Setting detail: THERAPIES SERIES
End: 2021-08-16
Attending: PHYSICIAN ASSISTANT
Payer: COMMERCIAL

## 2021-08-16 ENCOUNTER — HOSPITAL ENCOUNTER (OUTPATIENT)
Dept: OCCUPATIONAL THERAPY | Facility: CLINIC | Age: 56
Setting detail: THERAPIES SERIES
End: 2021-08-16
Attending: PHYSICIAN ASSISTANT
Payer: COMMERCIAL

## 2021-08-16 ENCOUNTER — HOSPITAL ENCOUNTER (OUTPATIENT)
Dept: PHYSICAL THERAPY | Facility: CLINIC | Age: 56
Setting detail: THERAPIES SERIES
End: 2021-08-16
Attending: PHYSICIAN ASSISTANT
Payer: COMMERCIAL

## 2021-08-16 PROCEDURE — 92507 TX SP LANG VOICE COMM INDIV: CPT | Mod: GN | Performed by: SPEECH-LANGUAGE PATHOLOGIST

## 2021-08-16 PROCEDURE — 97530 THERAPEUTIC ACTIVITIES: CPT | Mod: GO | Performed by: OCCUPATIONAL THERAPIST

## 2021-08-16 PROCEDURE — 97112 NEUROMUSCULAR REEDUCATION: CPT | Mod: GP | Performed by: PHYSICAL THERAPIST

## 2021-08-16 PROCEDURE — 97110 THERAPEUTIC EXERCISES: CPT | Mod: GP | Performed by: PHYSICAL THERAPIST

## 2021-08-25 ENCOUNTER — HOSPITAL ENCOUNTER (OUTPATIENT)
Dept: PHYSICAL THERAPY | Facility: CLINIC | Age: 56
Setting detail: THERAPIES SERIES
End: 2021-08-25
Attending: PHYSICIAN ASSISTANT
Payer: COMMERCIAL

## 2021-08-25 PROCEDURE — 97116 GAIT TRAINING THERAPY: CPT | Mod: GP | Performed by: PHYSICAL THERAPIST

## 2021-08-25 NOTE — PROGRESS NOTES
"Outpatient Physical Therapy Discharge Note     Patient: Otoniel Gomez  : 1965    Beginning/End Dates of Reporting Period:  2021 to 2021. He was seen for 4 visits.    Referring Provider: MIRNA Baker Diagnosis: Encephalopathy     Client Self Report: I get tired fast, my hgb is low. I am driving but not on interstate, [place] [address] [place]. Cannot lift a chain saw \"stil working on that\", Just a little dizzi when he gets tired.     Objective Measurements:  Sit to stand test from 18 inch high surface with no hands. 19 reps in 30 seconds. Good control    4 item DGI 11/12. Difficulty with gait and horizontal head motion. He slows down a bit and widens his NORMA, he feels he is slightly off balance.    4 square stepping test: 7.79 seconds (this is WNLS). No LOBl    6MWT on 2021 was 1552 feet or 1.3 m/s. This is WNLS for his age.       Goals:  Goal Identifier HEP-MET   Goal Description He will be able to perform HEP for leg strengthening, arm strengthening and balance IND daily. He will be able to demonstrate his HEP   Target Date 10/26/21   Date Met  21   Progress (detail required for progress note): He is doing HEP for strengthening     Goal Identifier 6MWT-MET   Goal Description He will be able to complete a 6MWT at > 1.1 m/s for community mobility (exercise)   Target Date 21   Date Met  21   Progress (detail required for progress note): 1552 feet or 1.3 m.s (WNLS)     Goal Identifier sit ot stand test-met   Goal Description Improve on sit ot stand test to be > 15 reps in 30 seconds for improved leg strength and dynamic balance   Target Date 21   Date Met  21   Progress (detail required for progress note): 19 reps today with good control     Assessment: Otoniel is moving well. He is not at risk for falls. Gait with head motion is a little off due to his previous cerebellar cancer treatment with surgery/radiation. He is back to his " baseline for mobility. Goals MET    Plan:  Discharge from therapy.    Reason for Discharge: Patient has met all goals. No further expectation of progress.    Equipment Issued: none    Patricia DEVLINT, MPT, NCS  Physical Therapist   Board Certified Neurologic Clinical Specialist     [facility], Lower Level   [address] [place] [address]   [email or web address]   [phone]   Clinic: [phone] //   Fax: [phone]

## 2021-08-26 ENCOUNTER — HOSPITAL ENCOUNTER (OUTPATIENT)
Dept: SPEECH THERAPY | Facility: CLINIC | Age: 56
Setting detail: THERAPIES SERIES
End: 2021-08-26
Attending: PHYSICIAN ASSISTANT
Payer: COMMERCIAL

## 2021-08-26 PROCEDURE — 92507 TX SP LANG VOICE COMM INDIV: CPT | Mod: GN | Performed by: SPEECH-LANGUAGE PATHOLOGIST

## 2021-08-26 NOTE — PROGRESS NOTES
Outpatient Speech Language Pathology Discharge Note     Patient: Otoniel Gomez  : 1965    Beginning/End Dates of Reporting Period:  2021 to 2021    Referring Provider: Lorenza Draper MD    Therapy Diagnosis: Receptive and expressive language deficits.    Client Self Report: Otoniel reports he's about 95% returned to his abilities prior to his encephalopathy and seizure.     Goals:  Goal Identifier receptive   Goal Description Otoniel will demonstrate at least 90% accuracy with complex, paragraph length material provided no more than mild cues and repetitions for improved functional communication.   Target Date 10/27/21   Date Met      Progress (detail required for progress note): Otoniel has demonstrated the ability to complete complex level material as well as engage in lower structure complex conversation with an average of 95% accuracy. Functionally he self-reports to feel 95% returned to his abilities compared to before his medical event.     Goal Identifier expressive   Goal Description Otoniel will completed complex level expressive language tasks with at least 90% accuracy provided no more than mild cues for improved functional communication.   Target Date 10/27/21   Date Met      Progress (detail required for progress note): Otoniel has shown an average of 90% accuracy with structured expressive language tasks. In lower structure, more functional tasks, this slightly increases to 95%. He reports no functional limits and feels as though his abilities have returned to 95% of what they were prior to his medical event.     Plan:  Discharge from therapy.    Discharge:    Reason for Discharge: Patient has met all goals.    Discharge Plan: No planning required.    Madison Ordonez MA, [facility]-SLP  [facility]  [phone] (Phone)  [phone] (Fax)

## 2021-08-27 ENCOUNTER — OFFICE VISIT (OUTPATIENT)
Dept: PHYSICAL MEDICINE AND REHAB | Facility: CLINIC | Age: 56
End: 2021-08-27
Payer: COMMERCIAL

## 2021-08-27 VITALS
DIASTOLIC BLOOD PRESSURE: 79 MMHG | HEART RATE: 59 BPM | BODY MASS INDEX: 24.11 KG/M2 | WEIGHT: 150 LBS | OXYGEN SATURATION: 100 % | HEIGHT: 66 IN | SYSTOLIC BLOOD PRESSURE: 128 MMHG

## 2021-08-27 DIAGNOSIS — Z92.21 HISTORY OF CHEMOTHERAPY: ICD-10-CM

## 2021-08-27 DIAGNOSIS — G93.40 ENCEPHALOPATHY: Primary | ICD-10-CM

## 2021-08-27 DIAGNOSIS — Z92.3 HISTORY OF RADIATION TO HEAD AND NECK REGION: ICD-10-CM

## 2021-08-27 DIAGNOSIS — Z87.898 HISTORY OF BRAIN TUMOR: ICD-10-CM

## 2021-08-27 PROCEDURE — 99215 OFFICE O/P EST HI 40 MIN: CPT | Performed by: PHYSICAL MEDICINE & REHABILITATION

## 2021-08-27 ASSESSMENT — PAIN SCALES - GENERAL: PAINLEVEL: NO PAIN (0)

## 2021-08-27 ASSESSMENT — MIFFLIN-ST. JEOR: SCORE: 1463.15

## 2021-08-27 NOTE — NURSING NOTE
"Chief Complaint   Patient presents with     Hospital F/U     ARU f/u CVA /HX. 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, E       Vitals:    08/27/21 1338   BP: 128/79   Pulse: 59   SpO2: 100%   Weight: 68 kg (150 lb)   Height: 1.676 m (5' 6\")       Body mass index is 24.21 kg/m .                            "

## 2021-08-27 NOTE — LETTER
2021       RE: Otoniel Gomez  [id number] [facility] [id number]     Dear Colleague,    Thank you for referring your patient, Otoniel Gomez, to the [facility] PHYSICAL MEDICINE AND REHABILITATION CLINIC [place] at [facility]. Please see a copy of my visit note below.           PM&R Clinic Note     Patient Name: Otoniel Gomez : 1965 Medical Record: [id number]     Requesting Physician/clinician: No att. 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  He was admitted to ARU on 21 for rehabilitation and ongoing medical management.      He remained medically stable throughout his rehab stay, with ongoing monitoring of his sodium, kidney function, respiratory and hydration status remaining stable.  No recurrent seizure activity observed.  He made notable gains with therapy and remains most limited by ongoing cognitive deficits, though no further agitation and thus able to be weaned off Depakote.  He also has some ongoing impairments in balance though safely ambulating without assistive device. Family training was completed by therapists, and patient is now ready to be discharged home with recommendation of 24/7 supervision from family initially and ongoing outpatient PT, OT, and SLP.  Sodium and kidney function are stable at discharge, patient and family educated on ongoing fluid restriction and will plan to follow up with PCP for ongoing monitoring  and recommendations. He discharged home on 6/18/21.      Current:  Overall doing well.  Has completed home therapy and starting to wrap up outpatient therapy.  No falls.  Not using assistive device.  No issues with bowel or bladder.  Following outpatient for electrolytes, Sodium has been stable.  No bouts of agitation.  Patient and family happy with progress.  Neck pain has gone away once at home and completed therapy.              Past Medical and Surgical History:     Past Medical History:   Diagnosis Date     Cancer (H)     brain tumor      Low back pain      Seizures (H)      Past Surgical History:   Procedure Laterality Date     CRANIOTOMY              Social History:     Social History     Tobacco Use     Smoking status: Never Smoker     Smokeless tobacco: Never Used   Substance Use Topics     Alcohol use: Yes     Comment: Alcoholic Drinks/day: occasional      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    ADLs: I  Assistive devices: none   iADLs (medication management and finances): I, some help with wife  Hand dominance: R             Family History:     Family History   Problem Relation Age of Onset     No Known Problems Mother      No Known Problems Father      No Known Problems Sister      No Known Problems Brother      No Known Problems Maternal Aunt      No Known Problems Maternal Uncle      No Known Problems Paternal Aunt      No Known Problems Paternal Uncle      Cerebrovascular Disease Maternal Grandmother      Coronary Artery Disease Maternal Grandfather      No Known Problems Paternal Grandmother      No Known Problems Paternal Grandfather              "Medications:     Current Outpatient Medications   Medication Sig Dispense Refill     acetaminophen (TYLENOL) 325 MG tablet Take 1-2 tablets (325-650 mg) by mouth every 4 hours as needed for mild pain or fever       aspirin (ASA) 81 MG chewable tablet Take 1 tablet (81 mg) by mouth daily 30 tablet 0     melatonin 5 MG tablet Take 1 tablet (5 mg) by mouth At Bedtime 30 tablet 0     simvastatin (ZOCOR) 20 MG tablet Take 1 tablet (20 mg) by mouth At Bedtime 30 tablet 0     famotidine (PEPCID) 10 MG tablet Take 1 tablet (10 mg) by mouth 2 times daily (Patient not taking: Reported on 8/27/2021) 60 tablet 0     Lidocaine (LIDOCARE) 4 % Patch Place 1 patch onto the skin every 24 hours To prevent lidocaine toxicity, patient should be patch free for 12 hrs daily. (Patient not taking: Reported on 8/27/2021) 30 patch 0     senna-docusate (SENOKOT-S/PERICOLACE) 8.6-50 MG tablet Take 1 tablet by mouth 2 times daily as needed for constipation (Patient not taking: Reported on 8/27/2021) 10 tablet 0            Allergies:     Allergies   Allergen Reactions     Fentanyl Other (See Comments)     Amoxicillin GI Disturbance     Gramineae Pollens      Omeprazole Other (See Comments)     Recommended by nephrologist during hospital stay May 2021, due to severe hyponatremia     Other Environmental Allergy      Sertraline Other (See Comments)     Severe hyponatremia     Thiazide-Type Diuretics Other (See Comments)     Severe hyponatremia              ROS:        ROS: 10 point ROS neg other than the symptoms noted above in the HPI.             Physical Examiniation:     VITAL SIGNS: /79   Pulse 59   Ht 1.676 m (5' 6\")   Wt 68 kg (150 lb)   SpO2 100%   BMI 24.21 kg/m    BMI: Estimated body mass index is 24.21 kg/m  as calculated from the following:    Height as of this encounter: 1.676 m (5' 6\").    Weight as of this encounter: 68 kg (150 lb).    Gen: NAD, pleasant and cooperative   HEENT: NCAT, EOMI, no nystagmus, SUNDAY, there is no " reproducible headache and eye strain with VOMS. No taut or tender cervical paraspinal muscles, no facial asymmetry   Cardio: 2+ radial pulse, well perfused  Pulm: non-labored breathing in room air, symmetrical chest rise  Abd: benign  Ext: WWP, no edema in BLE, no tenderness in calves  Neuro/MSK: 5/5 in c5-t1 and l2-s1 myotomes, SILT, CN 2-12 intact, AAOx3.  GAIT: WNFL               Laboratory/Imaging:     Last Comprehensive Metabolic Panel:  Sodium   Date Value Ref Range Status   06/17/2021 136 133 - 144 mmol/L Final     Potassium   Date Value Ref Range Status   06/17/2021 4.4 3.4 - 5.3 mmol/L Final     Chloride   Date Value Ref Range Status   06/17/2021 103 94 - 109 mmol/L Final     Carbon Dioxide   Date Value Ref Range Status   06/17/2021 29 20 - 32 mmol/L Final     Anion Gap   Date Value Ref Range Status   06/17/2021 4 3 - 14 mmol/L Final     Glucose   Date Value Ref Range Status   06/17/2021 88 70 - 99 mg/dL Final     Urea Nitrogen   Date Value Ref Range Status   06/17/2021 27 7 - 30 mg/dL Final     Creatinine   Date Value Ref Range Status   06/17/2021 1.26 (H) 0.66 - 1.25 mg/dL Final     GFR Estimate   Date Value Ref Range Status   06/17/2021 64 >60 mL/min/[1.73_m2] Final     Comment:     Non  GFR Calc  Starting 12/18/2018, serum creatinine based estimated GFR (eGFR) will be   calculated using the Chronic Kidney Disease Epidemiology Collaboration   (CKD-EPI) equation.       Calcium   Date Value Ref Range Status   06/17/2021 8.7 8.5 - 10.1 mg/dL Final                Assessment/Plan:     Otoniel was seen today for hospital f/u.    Diagnoses and all orders for this visit:    Encephalopathy    History of brain tumor    History of chemotherapy    History of radiation to head and neck region            1. Patient education: In depth discussion and education was provided about the assessment and implications of each of the below recommendations for management. Patient indicated readiness to learn, all  questions were answered and understanding of material presented was confirmed.    2. Work-up: none     3. Therapy/equipment/braces: complete therapy program    4. Medications: no additions     5. Interventions: discussed exercise and brain health     6. Referral / follow up with other providers: PCP, Neuro, follow up on TSH    7. Follow up: as needed     David Granados, DO  Physical Medicine & Rehabilitation    I spent a total of 45 minutes face-to-face with Otoniel Gomez during today's office visit. Over 50% of this time was spent counseling the patient and/or coordinating care. See note for details.     45 minutes spent on the date of the encounter doing chart review, history and exam, documentation and further activities as noted above      Again, thank you for allowing me to participate in the care of your patient.      Sincerely,    David Granados, DO

## 2021-09-01 ENCOUNTER — HOSPITAL ENCOUNTER (OUTPATIENT)
Dept: OCCUPATIONAL THERAPY | Facility: CLINIC | Age: 56
Setting detail: THERAPIES SERIES
End: 2021-09-01
Attending: PHYSICIAN ASSISTANT
Payer: COMMERCIAL

## 2021-09-01 PROCEDURE — 97530 THERAPEUTIC ACTIVITIES: CPT | Mod: GO | Performed by: OCCUPATIONAL THERAPIST

## 2021-09-02 NOTE — PROGRESS NOTES
Outpatient Occupational Therapy Discharge Note     Patient: Otoniel Gomez  : 1965    Beginning/End Dates of Reporting Period:  2021 to 2021    Referring Provider: Lorenza deleon PA-C    Therapy Diagnosis: decreased IADL performance    Client Self Report:      Objective Measurements:                                                    Goals:     Goal Identifier 1 HEP   Goal Description Pt will report compliance with home program 7/7 days including BUE strengthening exercises and calisthenic exercises to improve strength and endurance for IADLs.    Target Date 21   Date Met  21   Progress (detail required for progress note):     Goal Identifier 2 memory   Goal Description Pt will demonstrate 100% accuracy on memory recall and word finding activities/worksheets to improve function in I/ADLs.   Target Date 21   Date Met  21   Progress (detail required for progress note):     Goal Identifier 3 executive functioning   Goal Description Pt will demonstrate 100% accuracy in 2 executive functioning tasks including problem solving, planning and organizing for success with IADLs including medication set-up, driving, etc.    Target Date 21   Date Met  21   Progress (detail required for progress note):     Goal Identifier 4 driving   Goal Description Pt will completing [employer] driving screen with a  PASS  score on 3/3 subtests and right foot reaction time test for pt to return to driving.     Target Date 21   Date Met  21 (partially met with PASS on 2/3 driving activities)   Progress (detail required for progress note):     Goal Identifier     Goal Description     Target Date     Date Met      Progress (detail required for progress note):     Goal Identifier     Goal Description     Target Date     Date Met      Progress (detail required for progress note):     Goal Identifier     Goal Description     Target Date     Date Met      Progress (detail required for  progress note):     Goal Identifier     Goal Description     Target Date     Date Met      Progress (detail required for progress note):       Plan:  Discharge from therapy.    Discharge:    Reason for Discharge: Patient has met all goals.    Equipment Issued: none    Discharge Plan: Patient to continue home program: Continue all brain activities that includes words (srabble, banana grams, etc) and numbers (suduko),  5# weights with BUE AROM HEP or resistance bands, continue driving progression.

## 2023-04-02 ENCOUNTER — HEALTH MAINTENANCE LETTER (OUTPATIENT)
Age: 58
End: 2023-04-02

## 2024-06-08 ENCOUNTER — HEALTH MAINTENANCE LETTER (OUTPATIENT)
Age: 59
End: 2024-06-08